# Patient Record
Sex: FEMALE | Race: BLACK OR AFRICAN AMERICAN | Employment: FULL TIME | URBAN - METROPOLITAN AREA
[De-identification: names, ages, dates, MRNs, and addresses within clinical notes are randomized per-mention and may not be internally consistent; named-entity substitution may affect disease eponyms.]

---

## 2017-09-14 ENCOUNTER — OFFICE VISIT (OUTPATIENT)
Dept: GASTROENTEROLOGY | Facility: CLINIC | Age: 37
End: 2017-09-14

## 2017-09-14 VITALS
SYSTOLIC BLOOD PRESSURE: 102 MMHG | DIASTOLIC BLOOD PRESSURE: 72 MMHG | HEIGHT: 64 IN | BODY MASS INDEX: 26.98 KG/M2 | WEIGHT: 158 LBS

## 2017-09-14 DIAGNOSIS — Z86.19 HISTORY OF HELICOBACTER PYLORI INFECTION: Primary | ICD-10-CM

## 2017-09-14 PROCEDURE — 99212 OFFICE O/P EST SF 10 MIN: CPT | Performed by: INTERNAL MEDICINE

## 2017-09-14 PROCEDURE — 99203 OFFICE O/P NEW LOW 30 MIN: CPT | Performed by: INTERNAL MEDICINE

## 2017-09-14 RX ORDER — PANTOPRAZOLE SODIUM 40 MG/1
TABLET, DELAYED RELEASE ORAL
COMMUNITY
Start: 2017-06-21 | End: 2017-11-14

## 2017-09-14 NOTE — PATIENT INSTRUCTIONS
1.  We will research the medications used to treat the H. pylori infection in the past.  2.  Please obtain an H. pylori breath test.  No antibiotics or acid lowering medications for a few weeks prior. 3.  Further advice pending the above results.

## 2017-09-14 NOTE — PROGRESS NOTES
HPI:    Patient ID: Jacques Robertson is a 40year old female. HPI  The patient is self-referred by a coworker who is a patient of mine.   About 2 years prior she noted upper abdominal/periumbilical burning discomfort especially during or shortly after a me Exam   Constitutional: She is oriented to person, place, and time. She appears well-developed and well-nourished. No distress. Healthy in appearance  Vague historian   HENT:   Head: Normocephalic and atraumatic. Mouth/Throat: No oropharyngeal exudate. If negative I would give strong consideration to an upper endoscopy with esophageal and gastric biopsies and possibly Bravo pH monitoring to determine whether the patient's symptoms are due to other structural lesions, nonulcer dyspepsia or gastroesophagea

## 2017-09-16 ENCOUNTER — TELEPHONE (OUTPATIENT)
Dept: GASTROENTEROLOGY | Facility: CLINIC | Age: 37
End: 2017-09-16

## 2017-09-16 ENCOUNTER — APPOINTMENT (OUTPATIENT)
Dept: LAB | Facility: HOSPITAL | Age: 37
End: 2017-09-16
Attending: INTERNAL MEDICINE
Payer: COMMERCIAL

## 2017-09-16 DIAGNOSIS — Z86.19 HISTORY OF HELICOBACTER PYLORI INFECTION: ICD-10-CM

## 2017-09-16 PROCEDURE — 83013 H PYLORI (C-13) BREATH: CPT

## 2017-09-16 NOTE — TELEPHONE ENCOUNTER
GI RN staff: Please contact the patient's pharmacy listed below and have them fax a list of all medications prescribed within the past 2-3 years (specifically antibiotics that were used to treat H. pylori).

## 2017-09-17 LAB — H. PYLORI BREATH TEST: NEGATIVE

## 2017-09-18 NOTE — TELEPHONE ENCOUNTER
Contacted [facility] pharmacy and spoke with Sangeetha Thompson. She states she can only go back 24 months in the system and will fax all she has available of the pt's medication hx.    -Awaiting fax now.

## 2017-09-18 NOTE — TELEPHONE ENCOUNTER
Pharmacy records reviewed.   Pertinent antibiotics/GI medications prescribed as follows:    6/22/17: Amitiza  2/15/17: Amoxicillin and clarithromycin  7/25/16: Dicyclomine  5/26/16: Bismatrol  1-5/2016: Linzess, 145 mcg  2-5/2017 and 5/2016: Metronidazole

## 2017-09-21 ENCOUNTER — TELEPHONE (OUTPATIENT)
Dept: GASTROENTEROLOGY | Facility: CLINIC | Age: 37
End: 2017-09-21

## 2017-09-21 DIAGNOSIS — K21.9 GASTROESOPHAGEAL REFLUX DISEASE, ESOPHAGITIS PRESENCE NOT SPECIFIED: Primary | ICD-10-CM

## 2017-09-21 DIAGNOSIS — R10.13 DYSPEPSIA: ICD-10-CM

## 2017-09-21 NOTE — TELEPHONE ENCOUNTER
Dr. Otoniel Horton,     The pt's h. Pylori breath test resulted as NEGATIVE.  Per OV note on 9/16/17, the plan is \"If negative I would give strong consideration to an upper endoscopy with esophageal and gastric biopsies and possibly Bravo pH monitoring to determine wh

## 2017-09-21 NOTE — TELEPHONE ENCOUNTER
I spoke to the patient. We discussed the negative breath test for H. pylori. Next step in evaluation would be repeat EGD with esophageal and gastric biopsies and potentially Bravo pH monitoring. Rationale and nature discussed.   The patient is agreeable

## 2017-09-27 NOTE — TELEPHONE ENCOUNTER
Scheduled for:  EGD w/Bravo - 45126  Provider Name:  Dr. Tobin De Jesus  Date:  11/14/17  Location:  [facility]  Sedation:  MAC  Time:  8:00 am (pt is aware to arrive at 7:00 am)  Prep:  NPO after midnight, Prep instructions were given to pt over the phone, pt verbalized und

## 2017-11-07 ENCOUNTER — TELEPHONE (OUTPATIENT)
Dept: GASTROENTEROLOGY | Facility: CLINIC | Age: 37
End: 2017-11-07

## 2017-11-07 NOTE — TELEPHONE ENCOUNTER
Pt transferred from [facility] in endoscopy--wants work excuse for her egd/bravo on 11/14 to be sent to my chart. This was done.

## 2017-11-14 ENCOUNTER — SURGERY (OUTPATIENT)
Age: 37
End: 2017-11-14

## 2017-11-14 ENCOUNTER — ANESTHESIA EVENT (OUTPATIENT)
Dept: ENDOSCOPY | Facility: HOSPITAL | Age: 37
End: 2017-11-14
Payer: COMMERCIAL

## 2017-11-14 ENCOUNTER — HOSPITAL ENCOUNTER (OUTPATIENT)
Facility: HOSPITAL | Age: 37
Setting detail: HOSPITAL OUTPATIENT SURGERY
Discharge: HOME | End: 2017-11-14
Attending: INTERNAL MEDICINE | Admitting: INTERNAL MEDICINE
Payer: COMMERCIAL

## 2017-11-14 ENCOUNTER — ANESTHESIA (OUTPATIENT)
Dept: ENDOSCOPY | Facility: HOSPITAL | Age: 37
End: 2017-11-14
Payer: COMMERCIAL

## 2017-11-14 DIAGNOSIS — R10.13 DYSPEPSIA: ICD-10-CM

## 2017-11-14 DIAGNOSIS — K21.9 GASTROESOPHAGEAL REFLUX DISEASE, ESOPHAGITIS PRESENCE NOT SPECIFIED: ICD-10-CM

## 2017-11-14 DIAGNOSIS — K21.9 ACID REFLUX: Primary | ICD-10-CM

## 2017-11-14 PROCEDURE — 0DB98ZX EXCISION OF DUODENUM, VIA NATURAL OR ARTIFICIAL OPENING ENDOSCOPIC, DIAGNOSTIC: ICD-10-PCS | Performed by: INTERNAL MEDICINE

## 2017-11-14 PROCEDURE — 0DH582Z INSERTION OF MONITORING DEVICE INTO ESOPHAGUS, VIA NATURAL OR ARTIFICIAL OPENING ENDOSCOPIC: ICD-10-PCS | Performed by: INTERNAL MEDICINE

## 2017-11-14 PROCEDURE — 0DB38ZX EXCISION OF LOWER ESOPHAGUS, VIA NATURAL OR ARTIFICIAL OPENING ENDOSCOPIC, DIAGNOSTIC: ICD-10-PCS | Performed by: INTERNAL MEDICINE

## 2017-11-14 PROCEDURE — 91035 G-ESOPH REFLX TST W/ELECTROD: CPT | Performed by: INTERNAL MEDICINE

## 2017-11-14 PROCEDURE — 43239 EGD BIOPSY SINGLE/MULTIPLE: CPT | Performed by: INTERNAL MEDICINE

## 2017-11-14 PROCEDURE — 0DB18ZX EXCISION OF UPPER ESOPHAGUS, VIA NATURAL OR ARTIFICIAL OPENING ENDOSCOPIC, DIAGNOSTIC: ICD-10-PCS | Performed by: INTERNAL MEDICINE

## 2017-11-14 PROCEDURE — 0DB68ZX EXCISION OF STOMACH, VIA NATURAL OR ARTIFICIAL OPENING ENDOSCOPIC, DIAGNOSTIC: ICD-10-PCS | Performed by: INTERNAL MEDICINE

## 2017-11-14 RX ORDER — LIDOCAINE HYDROCHLORIDE 10 MG/ML
INJECTION, SOLUTION EPIDURAL; INFILTRATION; INTRACAUDAL; PERINEURAL AS NEEDED
Status: DISCONTINUED | OUTPATIENT
Start: 2017-11-14 | End: 2017-11-14 | Stop reason: SURG

## 2017-11-14 RX ORDER — HYDROCODONE BITARTRATE AND ACETAMINOPHEN 5; 325 MG/1; MG/1
2 TABLET ORAL AS NEEDED
Status: DISCONTINUED | OUTPATIENT
Start: 2017-11-14 | End: 2017-11-14

## 2017-11-14 RX ORDER — HYDROCODONE BITARTRATE AND ACETAMINOPHEN 5; 325 MG/1; MG/1
1 TABLET ORAL AS NEEDED
Status: DISCONTINUED | OUTPATIENT
Start: 2017-11-14 | End: 2017-11-14

## 2017-11-14 RX ORDER — SODIUM CHLORIDE, SODIUM LACTATE, POTASSIUM CHLORIDE, CALCIUM CHLORIDE 600; 310; 30; 20 MG/100ML; MG/100ML; MG/100ML; MG/100ML
INJECTION, SOLUTION INTRAVENOUS CONTINUOUS
Status: DISCONTINUED | OUTPATIENT
Start: 2017-11-14 | End: 2017-11-14

## 2017-11-14 RX ORDER — HYDROMORPHONE HYDROCHLORIDE 1 MG/ML
0.6 INJECTION, SOLUTION INTRAMUSCULAR; INTRAVENOUS; SUBCUTANEOUS EVERY 5 MIN PRN
Status: DISCONTINUED | OUTPATIENT
Start: 2017-11-14 | End: 2017-11-14

## 2017-11-14 RX ORDER — MORPHINE SULFATE 10 MG/ML
6 INJECTION, SOLUTION INTRAMUSCULAR; INTRAVENOUS EVERY 10 MIN PRN
Status: DISCONTINUED | OUTPATIENT
Start: 2017-11-14 | End: 2017-11-14

## 2017-11-14 RX ORDER — HALOPERIDOL 5 MG/ML
0.25 INJECTION INTRAMUSCULAR ONCE AS NEEDED
Status: DISCONTINUED | OUTPATIENT
Start: 2017-11-14 | End: 2017-11-14

## 2017-11-14 RX ORDER — ONDANSETRON 2 MG/ML
4 INJECTION INTRAMUSCULAR; INTRAVENOUS ONCE AS NEEDED
Status: DISCONTINUED | OUTPATIENT
Start: 2017-11-14 | End: 2017-11-14

## 2017-11-14 RX ORDER — MORPHINE SULFATE 4 MG/ML
2 INJECTION, SOLUTION INTRAMUSCULAR; INTRAVENOUS EVERY 10 MIN PRN
Status: DISCONTINUED | OUTPATIENT
Start: 2017-11-14 | End: 2017-11-14

## 2017-11-14 RX ORDER — MORPHINE SULFATE 4 MG/ML
4 INJECTION, SOLUTION INTRAMUSCULAR; INTRAVENOUS EVERY 10 MIN PRN
Status: DISCONTINUED | OUTPATIENT
Start: 2017-11-14 | End: 2017-11-14

## 2017-11-14 RX ORDER — HYDROMORPHONE HYDROCHLORIDE 1 MG/ML
0.4 INJECTION, SOLUTION INTRAMUSCULAR; INTRAVENOUS; SUBCUTANEOUS EVERY 5 MIN PRN
Status: DISCONTINUED | OUTPATIENT
Start: 2017-11-14 | End: 2017-11-14

## 2017-11-14 RX ORDER — HYDROMORPHONE HYDROCHLORIDE 1 MG/ML
0.2 INJECTION, SOLUTION INTRAMUSCULAR; INTRAVENOUS; SUBCUTANEOUS EVERY 5 MIN PRN
Status: DISCONTINUED | OUTPATIENT
Start: 2017-11-14 | End: 2017-11-14

## 2017-11-14 RX ORDER — NALOXONE HYDROCHLORIDE 0.4 MG/ML
80 INJECTION, SOLUTION INTRAMUSCULAR; INTRAVENOUS; SUBCUTANEOUS AS NEEDED
Status: DISCONTINUED | OUTPATIENT
Start: 2017-11-14 | End: 2017-11-14

## 2017-11-14 RX ADMIN — SODIUM CHLORIDE, SODIUM LACTATE, POTASSIUM CHLORIDE, CALCIUM CHLORIDE: 600; 310; 30; 20 INJECTION, SOLUTION INTRAVENOUS at 08:09:00

## 2017-11-14 RX ADMIN — SODIUM CHLORIDE, SODIUM LACTATE, POTASSIUM CHLORIDE, CALCIUM CHLORIDE: 600; 310; 30; 20 INJECTION, SOLUTION INTRAVENOUS at 08:25:00

## 2017-11-14 RX ADMIN — LIDOCAINE HYDROCHLORIDE 25 MG: 10 INJECTION, SOLUTION EPIDURAL; INFILTRATION; INTRACAUDAL; PERINEURAL at 08:12:00

## 2017-11-14 NOTE — OPERATIVE REPORT
[facility] Endoscopy Report      Date of Procedure:  11/14/17        Preoperative Diagnosis:  Dyspepsia and possible gastroesophageal reflux      Postoperative Diagnosis:  2 cm hiatal hernia      Procedure:    Esophagogastroduodenoscopy wit duodenal bulb and post bulbar regions were normal.  Biopsies of the second portion the duodenum were obtained. Following diagnostic endoscopy the Bravo delivery catheter was placed into the esophagus. Endoscopy confirmed placement within the esophagus.

## 2017-11-14 NOTE — ANESTHESIA PREPROCEDURE EVALUATION
Anesthesia PreOp Note    HPI:     Danial Velazco is a 40year old female who presents for preoperative consultation requested by: Mei Conde MD    Date of Surgery: 11/14/2017    Procedure(s):  BRAVO ESOPHAGOGASTRODUODENOSCOPY  ESOPHAGOGASTRODUOD reviewed and Nursing notes reviewed    Airway   Mallampati: II  Dental - normal exam     Pulmonary - negative ROS and normal exam   Cardiovascular - negative ROS and normal exam  Exercise tolerance: good    Neuro/Psych - negative ROS     GI/Hepatic/Renal -

## 2017-11-14 NOTE — ANESTHESIA POSTPROCEDURE EVALUATION
Patient: Nohemi Nichols    Procedure Summary     Date:  11/14/17 Room / Location:  [address] [place] [address] ENDOSCOPY 05 / [address] [place] [address] ENDOSCOPY    Anesthesia Start:  0809 Anesthesia Stop:  6196    Procedures:       BRAVO ESOPHAGOGASTRODUODENOSCOPY (N/A )      Nish Bass

## 2017-11-14 NOTE — H&P
History & Physical Examination    Patient Name: Maryan Mora  MRN: [id number]  CSN: [id number]  YOB: 1980    Diagnosis: Dyspepsia possible GERD        Prescriptions Prior to Admission:  Pantoprazole Sodium 40 MG Oral Tab EC  Disp:  Rfl:  [facility]

## 2017-11-15 VITALS
BODY MASS INDEX: 26.12 KG/M2 | WEIGHT: 153 LBS | RESPIRATION RATE: 16 BRPM | TEMPERATURE: 98 F | HEART RATE: 83 BPM | HEIGHT: 64 IN | SYSTOLIC BLOOD PRESSURE: 118 MMHG | OXYGEN SATURATION: 100 % | DIASTOLIC BLOOD PRESSURE: 73 MMHG

## 2017-11-16 ENCOUNTER — OFFICE VISIT (OUTPATIENT)
Dept: OBGYN CLINIC | Facility: CLINIC | Age: 37
End: 2017-11-16

## 2017-11-16 VITALS
BODY MASS INDEX: 26.29 KG/M2 | WEIGHT: 154 LBS | HEIGHT: 64 IN | DIASTOLIC BLOOD PRESSURE: 68 MMHG | SYSTOLIC BLOOD PRESSURE: 112 MMHG

## 2017-11-16 DIAGNOSIS — N89.8 VAGINAL DISCHARGE: Primary | ICD-10-CM

## 2017-11-16 PROCEDURE — 99204 OFFICE O/P NEW MOD 45 MIN: CPT | Performed by: OBSTETRICS & GYNECOLOGY

## 2017-11-16 PROCEDURE — 87106 FUNGI IDENTIFICATION YEAST: CPT | Performed by: OBSTETRICS & GYNECOLOGY

## 2017-11-16 PROCEDURE — 87205 SMEAR GRAM STAIN: CPT | Performed by: OBSTETRICS & GYNECOLOGY

## 2017-11-16 PROCEDURE — 87808 TRICHOMONAS ASSAY W/OPTIC: CPT | Performed by: OBSTETRICS & GYNECOLOGY

## 2017-11-16 RX ORDER — VALACYCLOVIR HYDROCHLORIDE 1 G/1
1000 TABLET, FILM COATED ORAL
COMMUNITY
Start: 2017-09-21 | End: 2018-09-11

## 2017-11-16 RX ORDER — ALBUTEROL SULFATE 90 UG/1
2 AEROSOL, METERED RESPIRATORY (INHALATION)
COMMUNITY
Start: 2017-09-21 | End: 2018-09-11

## 2017-11-16 RX ORDER — PANTOPRAZOLE SODIUM 40 MG/1
40 TABLET, DELAYED RELEASE ORAL
COMMUNITY
Start: 2017-06-21 | End: 2018-03-07 | Stop reason: CLARIF

## 2017-11-16 RX ORDER — CLINDAMYCIN HYDROCHLORIDE 300 MG/1
300 CAPSULE ORAL 2 TIMES DAILY
Qty: 14 CAPSULE | Refills: 0 | Status: SHIPPED | OUTPATIENT
Start: 2017-11-16 | End: 2018-01-03

## 2017-11-16 RX ORDER — LUBIPROSTONE 8 UG/1
16 CAPSULE, GELATIN COATED ORAL
COMMUNITY
Start: 2017-06-21 | End: 2018-01-09 | Stop reason: ALTCHOICE

## 2017-11-16 NOTE — PROGRESS NOTES
GYN H&P     2017  12:27 PM    CC: Patient is here to establish care    HPI: Patient is a 40year old   here with concerns about recurrent BV 8x/year, currently with symptoms of vaginal discharge and odor. Cycles regular. No pelvic pain.  No abn Social History Main Topics   Smoking status: Never Smoker    Smokeless tobacco: Never Used    Alcohol use Yes    Comment: socially    Drug use: No    Sexual activity: Not on file     Other Topics Concern   None on file     Social History Narrative   No

## 2017-11-20 ENCOUNTER — TELEPHONE (OUTPATIENT)
Dept: OBGYN CLINIC | Facility: CLINIC | Age: 37
End: 2017-11-20

## 2017-11-20 RX ORDER — FLUCONAZOLE 150 MG/1
150 TABLET ORAL ONCE
Qty: 2 TABLET | Refills: 0 | Status: SHIPPED | OUTPATIENT
Start: 2017-11-20 | End: 2017-11-20

## 2017-11-28 PROBLEM — J45.20 ASTHMA, MILD INTERMITTENT (HCC): Status: ACTIVE | Noted: 2017-11-28

## 2017-11-28 PROBLEM — J45.20 ASTHMA, MILD INTERMITTENT: Status: ACTIVE | Noted: 2017-11-28

## 2017-11-29 ENCOUNTER — OFFICE VISIT (OUTPATIENT)
Dept: FAMILY MEDICINE CLINIC | Facility: CLINIC | Age: 37
End: 2017-11-29

## 2017-11-29 VITALS
HEIGHT: 64 IN | OXYGEN SATURATION: 98 % | DIASTOLIC BLOOD PRESSURE: 64 MMHG | BODY MASS INDEX: 26.29 KG/M2 | HEART RATE: 72 BPM | WEIGHT: 154 LBS | SYSTOLIC BLOOD PRESSURE: 112 MMHG | TEMPERATURE: 99 F

## 2017-11-29 DIAGNOSIS — J30.2 CHRONIC SEASONAL ALLERGIC RHINITIS, UNSPECIFIED TRIGGER: Primary | ICD-10-CM

## 2017-11-29 DIAGNOSIS — J11.1 INFLUENZA-LIKE ILLNESS: ICD-10-CM

## 2017-11-29 PROCEDURE — 99202 OFFICE O/P NEW SF 15 MIN: CPT | Performed by: FAMILY MEDICINE

## 2017-11-29 PROCEDURE — 87631 RESP VIRUS 3-5 TARGETS: CPT | Performed by: FAMILY MEDICINE

## 2017-11-29 RX ORDER — AZELASTINE HCL 205.5 UG/1
2 SPRAY NASAL DAILY
Qty: 1 EACH | Refills: 3 | Status: SHIPPED | OUTPATIENT
Start: 2017-11-29 | End: 2018-02-27

## 2017-11-29 RX ORDER — MONTELUKAST SODIUM 10 MG/1
10 TABLET ORAL NIGHTLY
Qty: 30 TABLET | Refills: 2 | Status: SHIPPED | OUTPATIENT
Start: 2017-11-29 | End: 2018-02-27

## 2017-11-29 NOTE — PROGRESS NOTES
CC:  Patient presents with:  New Patient  Other: was having diarrhea, vomiting, chills-now feeling better  Chest Congestion: x 4 months      HPI: 40year old female with hx of intermittent asthma here for congestion x 4 months and resolved diarrhea/vomitin None on file     Social History Narrative   None on file         Current Outpatient Prescriptions:  Azelastine HCl 0.15 % Nasal Solution 2 sprays by Nasal route daily.  Disp: 1 each Rfl: 3   Montelukast Sodium 10 MG Oral Tab Take 1 tablet (10 mg total) by Lalo Templeton DO  11/29/17  11:22 AM

## 2017-11-29 NOTE — PATIENT INSTRUCTIONS
Allergic Rhinitis  Allergic rhinitis is an allergic reaction that affects the nose, and often the eyes. It’s often known as nasal allergies. Nasal allergies are often due to things in the environment that are breathed in.  Depending what you are sensitive · Keep humidity low by using a dehumidifier or air conditioner. Keep the dehumidifier and air conditioner clean and free of mold. · Clean moldy areas with bleach and water. In general:  · Vacuum once or twice a week.  If possible, use a vacuum with a high

## 2017-12-13 ENCOUNTER — TELEPHONE (OUTPATIENT)
Dept: GASTROENTEROLOGY | Facility: CLINIC | Age: 37
End: 2017-12-13

## 2017-12-13 NOTE — TELEPHONE ENCOUNTER
Pt. States that she was not able to get her medication as Prior Naoma Dave is needed for the Rifaximin med. Please call [facility] BS for prior Auth.

## 2017-12-13 NOTE — TELEPHONE ENCOUNTER
Received fax and sent back    Does not have have hepatic encephalopathy or IBS-D    History of SIBO, history of h pylori, dyspepsia    Tried and failed many other drugs. See previous encounter.     Form faxed back today

## 2017-12-15 NOTE — TELEPHONE ENCOUNTER
Received denial fax from [facility]    Will only approve for IBS-D or hepatic encephalopathy.      Please advise

## 2017-12-18 NOTE — TELEPHONE ENCOUNTER
Received fax back from [facility] that already denied. We are aware it was denied, which is why we sent in APPEAL paperwork to fax # specifically noted on denial paperwork.     I called [facility] today and spoke with Edel Means who asked his senior supervi

## 2017-12-20 NOTE — TELEPHONE ENCOUNTER
First level appeal was denied.  Now stating we can also do a 2nd level appeal. This was fax in and await determination

## 2017-12-29 RX ORDER — AMOXICILLIN AND CLAVULANATE POTASSIUM 875; 125 MG/1; MG/1
1 TABLET, FILM COATED ORAL 2 TIMES DAILY
Qty: 28 TABLET | Refills: 0 | Status: SHIPPED | OUTPATIENT
Start: 2017-12-29 | End: 2018-03-07 | Stop reason: ALTCHOICE

## 2017-12-29 NOTE — TELEPHONE ENCOUNTER
Spoke with pt. She does not want to take metronidazole. Wants to try Augmentin. Reviewed BID for 14 days. Confirmed pharmacy and advised she call us back if she develops any severe side effects or reactions to this medication.     Can call in 2 week

## 2017-12-29 NOTE — TELEPHONE ENCOUNTER
We could use either Augmentin 875 mg twice daily for 14 days or a combination of cephalexin and metronidazole, each taken together at 500 mg 3 times daily for 14 days. Patient preference.

## 2018-01-02 ENCOUNTER — OFFICE VISIT (OUTPATIENT)
Dept: OBGYN CLINIC | Facility: CLINIC | Age: 38
End: 2018-01-02

## 2018-01-02 VITALS
SYSTOLIC BLOOD PRESSURE: 106 MMHG | BODY MASS INDEX: 27.31 KG/M2 | DIASTOLIC BLOOD PRESSURE: 70 MMHG | HEIGHT: 64 IN | WEIGHT: 160 LBS

## 2018-01-02 DIAGNOSIS — N76.0 VAGINITIS AND VULVOVAGINITIS: Primary | ICD-10-CM

## 2018-01-03 ENCOUNTER — TELEPHONE (OUTPATIENT)
Dept: OBGYN CLINIC | Facility: CLINIC | Age: 38
End: 2018-01-03

## 2018-01-03 RX ORDER — CLINDAMYCIN HYDROCHLORIDE 300 MG/1
300 CAPSULE ORAL 2 TIMES DAILY
Qty: 14 CAPSULE | Refills: 0 | Status: SHIPPED | OUTPATIENT
Start: 2018-01-03 | End: 2018-01-10

## 2018-01-03 NOTE — TELEPHONE ENCOUNTER
Pt calling with c/o vaginal odor with yellowish discharge. She stated that she had the same symptoms in November for which she was treated for BV. Rx for Clindamycin sent to [facility]. Schedule appointment if no improvement.

## 2018-01-09 ENCOUNTER — OFFICE VISIT (OUTPATIENT)
Dept: FAMILY MEDICINE CLINIC | Facility: CLINIC | Age: 38
End: 2018-01-09

## 2018-01-09 ENCOUNTER — APPOINTMENT (OUTPATIENT)
Dept: LAB | Facility: REFERENCE LAB | Age: 38
End: 2018-01-09
Attending: FAMILY MEDICINE
Payer: COMMERCIAL

## 2018-01-09 VITALS
HEIGHT: 64 IN | BODY MASS INDEX: 27.31 KG/M2 | DIASTOLIC BLOOD PRESSURE: 80 MMHG | RESPIRATION RATE: 17 BRPM | OXYGEN SATURATION: 96 % | HEART RATE: 97 BPM | SYSTOLIC BLOOD PRESSURE: 100 MMHG | WEIGHT: 160 LBS | TEMPERATURE: 98 F

## 2018-01-09 DIAGNOSIS — R10.84 GENERALIZED ABDOMINAL PAIN: ICD-10-CM

## 2018-01-09 DIAGNOSIS — R14.0 BLOATING: ICD-10-CM

## 2018-01-09 DIAGNOSIS — K59.04 CHRONIC IDIOPATHIC CONSTIPATION: ICD-10-CM

## 2018-01-09 DIAGNOSIS — R14.0 BLOATING: Primary | ICD-10-CM

## 2018-01-09 PROCEDURE — 86628 CANDIDA ANTIBODY: CPT | Performed by: FAMILY MEDICINE

## 2018-01-09 PROCEDURE — 86001 ALLERGEN SPECIFIC IGG: CPT | Performed by: FAMILY MEDICINE

## 2018-01-09 PROCEDURE — 36415 COLL VENOUS BLD VENIPUNCTURE: CPT | Performed by: FAMILY MEDICINE

## 2018-01-09 PROCEDURE — 99214 OFFICE O/P EST MOD 30 MIN: CPT | Performed by: FAMILY MEDICINE

## 2018-01-09 PROCEDURE — 82306 VITAMIN D 25 HYDROXY: CPT | Performed by: FAMILY MEDICINE

## 2018-01-09 NOTE — PROGRESS NOTES
Alejo Villarreal is a 40year old female. Patient presents with:  Stomach Pain: patient comes into office c/o constant stomach pain which has been going on for 3 years.  Patient states she has trouble using bathroon, unable to finish meal, unable to drinkl Montelukast Sodium 10 MG Oral Tab Take 1 tablet (10 mg total) by mouth nightly. Disp: 30 tablet Rfl: 2   Albuterol Sulfate  (90 Base) MCG/ACT Inhalation Aero Soln Inhale 2 puffs into the lungs.  Disp:  Rfl:    Pantoprazole Sodium 40 MG Oral Tab EC Ta Neurological: She is alert and oriented to person, place, and time. No cranial nerve deficit. Gait normal.   Skin: Skin is warm and dry. Psychiatric: Affect normal.       ASSESSMENT AND PLAN:   1. Bloating  - CANDIDA IGG/A/M AB PANEL;  Future  - ALLERGEN, The products and items listed below (the “Products”)  and their claims have not been evaluated by the Food and Drug Administration. Dietary products are not intended to treat, prevent, mitigate or cure disease.  Ultimately, you must draw your own conclusion Return in about 1 week (around 1/16/2018) for Integrative Medicine - Established (30 min). Patient affirmed understanding of plan and all questions were answered.      Jacques Urban, DO

## 2018-01-09 NOTE — PATIENT INSTRUCTIONS
KNOW BEFORE YOU GO! Coverage for testing may vary based on your insurance plan. In order to estimate your out-of-pocket cost for any labs, tests or imaging ordered, please check with your insurance company or use the link below:    [employer] or Start taking 1/8 tsp daily and increase dose every 5-7 days until taking 1 tsp 3 times daily. Reduce to lowest dose tolerated if any reactions occur.  Buy it online at  http://NewsWhip/ or at the [employer]! in [address]  In

## 2018-01-10 LAB — 25(OH)D3 SERPL-MCNC: 9.9 NG/ML

## 2018-01-11 LAB
CANDIDA ANTIBODY IGA: 1.27 EV
CANDIDA ANTIBODY IGG: 1.23 EV
CANDIDA ANTIBODY IGM: 0.48 EV

## 2018-01-12 LAB
ALLERGEN ALMOND IGG: <2 MCG/ML
ALLERGEN BAKERS YEAST IGG: 2.31 MCG/ML
ALLERGEN BANANA IGG: <2 MCG/ML
ALLERGEN CASEIN (COW'S MILK) IGG: 4.75 MCG/ML
ALLERGEN CHICKEN IGG: <2 MCG/ML
ALLERGEN CORN IGG: <2 MCG/ML
ALLERGEN EGG WHITE IGG: <2 MCG/ML
ALLERGEN GARLIC IGG: 2.12 MCG/ML
ALLERGEN GLUTEN IGG: 2.23 MCG/ML
ALLERGEN OAT IGG: 2.61 MCG/ML
ALLERGEN ORANGE IGG: <2 MCG/ML
ALLERGEN PEANUT IGG: <2 MCG/ML
ALLERGEN RICE IGG: <2 MCG/ML
ALLERGEN SOYBEAN IGG: <2 MCG/ML
ALLERGEN TOMATO IGG: <2 MCG/ML
ALLERGEN WHEAT IGG: 3.58 MCG/ML
ALLERGEN WHEY IGG: 10.4 MCG/ML
ALLERGEN WHITE POTATO, IGG: <2 MCG/ML

## 2018-02-12 RX ORDER — ERGOCALCIFEROL 1.25 MG/1
50000 CAPSULE ORAL WEEKLY
Qty: 12 CAPSULE | Refills: 1 | Status: SHIPPED | OUTPATIENT
Start: 2018-02-12 | End: 2018-09-05

## 2018-02-27 ENCOUNTER — OFFICE VISIT (OUTPATIENT)
Dept: OBGYN CLINIC | Facility: CLINIC | Age: 38
End: 2018-02-27

## 2018-02-27 VITALS
HEIGHT: 64 IN | BODY MASS INDEX: 26.14 KG/M2 | WEIGHT: 153.13 LBS | DIASTOLIC BLOOD PRESSURE: 64 MMHG | SYSTOLIC BLOOD PRESSURE: 108 MMHG

## 2018-02-27 DIAGNOSIS — N89.8 VAGINAL DISCHARGE: Primary | ICD-10-CM

## 2018-02-27 PROCEDURE — 87205 SMEAR GRAM STAIN: CPT | Performed by: OBSTETRICS & GYNECOLOGY

## 2018-02-27 PROCEDURE — 99212 OFFICE O/P EST SF 10 MIN: CPT | Performed by: OBSTETRICS & GYNECOLOGY

## 2018-02-27 PROCEDURE — 87106 FUNGI IDENTIFICATION YEAST: CPT | Performed by: OBSTETRICS & GYNECOLOGY

## 2018-02-27 PROCEDURE — 87808 TRICHOMONAS ASSAY W/OPTIC: CPT | Performed by: OBSTETRICS & GYNECOLOGY

## 2018-02-27 NOTE — PROGRESS NOTES
Chandra Severance is a 40year old female. HPI:   Patient presents with:  Vaginal Problem: Vaginal discharge       BV+/yeast- 11/2017 Tx'd with oral Clindamycin then re-treated 1/2018 for similar symptoms now with perisitent vaginal discharge.  No STD scree

## 2018-02-28 ENCOUNTER — TELEPHONE (OUTPATIENT)
Dept: OBGYN CLINIC | Facility: CLINIC | Age: 38
End: 2018-02-28

## 2018-02-28 RX ORDER — CLINDAMYCIN HYDROCHLORIDE 300 MG/1
300 CAPSULE ORAL 2 TIMES DAILY
Qty: 14 CAPSULE | Refills: 0 | Status: SHIPPED | OUTPATIENT
Start: 2018-02-28 | End: 2018-03-07

## 2018-02-28 NOTE — TELEPHONE ENCOUNTER
----- Message from Stacy Gould MD sent at 2/28/2018 10:59 AM CST -----  Your lab results are positive for BV with yeast still pending    An eRx will be done for oral Clindamycin 300 mg twice daily for 7d  We will notify you when yeast results return.

## 2018-02-28 NOTE — TELEPHONE ENCOUNTER
Spoke with pt given results of Negative Trich and + Bv with yeast still pending. Rx sent to the pharmacy.

## 2018-03-01 LAB
GENITAL VAGINOSIS SCREEN: POSITIVE
TRICHOMONAS SCREEN: NEGATIVE

## 2018-03-06 ENCOUNTER — OFFICE VISIT (OUTPATIENT)
Dept: FAMILY MEDICINE CLINIC | Facility: CLINIC | Age: 38
End: 2018-03-06

## 2018-03-06 VITALS
DIASTOLIC BLOOD PRESSURE: 72 MMHG | BODY MASS INDEX: 26.46 KG/M2 | SYSTOLIC BLOOD PRESSURE: 100 MMHG | HEART RATE: 70 BPM | RESPIRATION RATE: 17 BRPM | HEIGHT: 64 IN | OXYGEN SATURATION: 99 % | WEIGHT: 155 LBS

## 2018-03-06 DIAGNOSIS — B37.9 CANDIDIASIS: ICD-10-CM

## 2018-03-06 DIAGNOSIS — J45.20 MILD INTERMITTENT ASTHMA WITHOUT COMPLICATION: ICD-10-CM

## 2018-03-06 DIAGNOSIS — J30.2 CHRONIC SEASONAL ALLERGIC RHINITIS: Primary | ICD-10-CM

## 2018-03-06 DIAGNOSIS — E66.3 OVERWEIGHT (BMI 25.0-29.9): ICD-10-CM

## 2018-03-06 DIAGNOSIS — T78.1XXA FOOD SENSITIVITY WITH GASTROINTESTINAL SYMPTOMS: ICD-10-CM

## 2018-03-06 PROCEDURE — 99214 OFFICE O/P EST MOD 30 MIN: CPT | Performed by: FAMILY MEDICINE

## 2018-03-06 NOTE — PROGRESS NOTES
Chandra Severance is a 40year old female. Patient presents with: Follow - Up: diet and lab results      HPI:   Improvements: a lot less pain in her stomach, more energy. Generally feeling good nw. Feels so much better.    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 - How many visits are allowed for the year? - Do you have an annual deductible and has it been met?  - Do you have a co-payment or co-insurance?  - Do you have in or out-of-network coverage?     On · 1-2 oz crushed ice  · 1-2 scoops protein powder  · 5 medium frozen blackberries  · 5 medium frozen blueberries  · 7 pecans  Combine in  and mix until smooth.   Cherry Vanilla Shake:    · 2 scoops vanilla protein powder  · 8 frozen cherries  · 1 cup DRINKS  Decaf coffee  Green tea  Vegetable juice  Vegetables HIGH SUGAR FRUITS  Bananas  Dates  Fruit juices  Grapes  Uzair  Raisins  GLUTINOUS GRAINS  Barley  [place]  Spelt  Wheat  TOXIC MEATS & FISH  Pork  Processed meats  Shellfish  Swordfish  Tuna  SOME D Fruits should generally be avoided or minimized until later in the diet, but there are some exceptions. Penny and limes are really nutritious low sugar fruits that you can use to flavor your food and salad dressing.  Avocados and olives are also fruits, al Probiotic yogurt and kefir tend to be low in lactose and casein, the natural sugar and protein that are responsible for many dairy intolerances. Nuts And Seeds  Nuts like almond and hazelnut are healthy and have a low mold content.  Any residual mold can b Making your own fermented foods at home will guarantee quality and allow you to reduce the sugar content even lower than the store-bought varieties.   Drinks  There are lots of herbal teas that have antifungal properties, and in fact you can drink any herba You should aim to eliminate caffeine completely, but there are other options if that’s proving impossible for you. Decaf coffee still contains small amounts of caffeine, but look for decaf that is made by the Swiss water process.  Green tea is also lower in Many people don’t realize that the grain varieties we eat today are very different from those that our ancestors enjoyed. Today’s varieties have increased yields, but they also have much more gluten than was present before.  Our digestive systems are simply Whey isolate is another dairy food that should be avoided. The processing used to produce whey isolates produces damaging D optical isomers, which are not designed to be in your body.  When these D proteins end up in your bone, brain and muscle, they have b Refined and processed vegetable oils are generally bad news for your health, and you should avoid them while on this anti-Candida diet. There are plenty of excellent oils to choose from, without resorting to these over-processed vegetable derivatives.  Also I already mentioned fruit juices in the fruits section – these should be avoided because of the way that they affect your blood sugar. Also steer yourself away from sugary soft drinks, and diet drinks that contain aspartame or sucralose.  Energy drinks tend ? ½ of a medium banana, peeled  ? ½” fresh robert – peeled and cut from stalk (about ½ tsp)  ? ¼ tsp ground turmeric  ? 3 mint leaves - rough chopped  ? 1 scoop protein powder  ? 1 Tbsp farrah seeds  ?  4-5 ice cubes (or more or less to personal desired consi 4 cups chopped yellow onions (3 large onions)   4 cups chopped leeks, white part only (2 leeks)   1 tablespoon minced garlic (3 cloves)   1/4 cup good olive oil, plus additional for drizzling on top   1 tablespoon kosher salt   1-½ teaspoons freshly ground 3. In a small bowl, whisk together the red wine vinegar, Loy and oregano. 4. Add honey and olive oil, to taste, and blend until emulsified. Season with salt and pepper, to taste. 5. Add the dressing to the eggplant mixture and toss. 4. Roast until the chicken is just cooked through, about 1 hour. If your chicken browns too quickly, cover it with foil for the remaining cooking time. 5. Transfer the chicken to a serving platter.     HERBED SALMON  Ingredients  1/2 seedless cucumber 2 teaspoons toasted sesame oil  1 teaspoon adalberto  2 eggs, beaten  1 tablespoon Organic Shelled Hemp Seed    Directions  1. In a large wok or nonstick skillet heat the coconut oil.  Sauté the carrot, onion, and white scallion over high heat until soft and 1 cup + 2 Tbsp almond butter (or any nut butter)  2 Tbsp coconut oil  4 1/2 oz dark chocolate? Directions  1. In a large bowl, combine the almond flour, coconut, and sweetener. 2. Over medium-low heat melt 1 cup of almond butter and coconut oil.

## 2018-03-06 NOTE — PATIENT INSTRUCTIONS
In order to determine if Medical Nutrition Therapy and/or Acupuncture are covered benefits, I encourage you call your insurance company and ask:  - If (acupuncture/medical nutrition) is a covered benefit?  - Does your coverage have any exclusions or limita Try green tea instead of coffee for a little caffeine    For breakfast: non-dairy protein shake with almond milk, or non-dairy smoothie    Protein Shake Recipes    Berry Shake:    · 10 oz cold filtered water  · 1-2 oz crushed ice  · 1-2 scoops protein powd Pears  Strawberries  Watermelon  GRAINS & PSEUDO-GRAINS  Amaranth  Black rice  Brown rice  Wild rice  RED MEATS  Beef  Lamb  Venison  NUTS  Nut milks  Nut butters  Walnuts  Pecans  FERMENTED FOODS  Kombucha  Kvass  DRINKS  Decaf coffee  Green tea  Vegetabl Minimize starchy vegetables such as sweet potatoes, potatoes, yams, corn, winter squash, beets, peas, parsnips, and beans, especially in the early part of your treatment.  When you switch to a low sugar diet, there is often a temptation to eat lots of starc The best dairy products to eat are the fermented ones like yogurt and kefir. Live probiotic cultures help your gut to repopulate with good bacteria.  The live bacteria in the yogurt will crowd out the Candida yeast and restore balance to your digestive syst Stevia, erythritol, and xylitol can be used in place of sugar, but they have a much smaller effect on your blood sugar levels. You generally need only a little of these sweeteners, as they are much sweeter tasting than sugar.  These sweeteners are particula Nut milks and butters tend to be more problematic than the nuts themselves. Making your own is the best way, but if you can’t do that then make sure that you buy a reputable organic brand.   Fermented Foods  Foods like kefir and sauerkraut tend to be most b Gluten is a very common trigger for food sensitivities, and often results in symptoms like bloating, indigestion, cramping, brain fog, and fatigue. If you already have a Candida problem, eating gluten is likely to exacerbate your symptoms.  It also likely t Most dairy should be avoided except ghee, butter, kefir, and probiotic yogurt.  Dairy foods tend to contain lots of natural sugars (e.g. lactose), and they can also be difficult to digest. Many people have latent sensitivities to dairy products (especially Many condiments and salad dressings tend to contain large amounts of hidden sugars, which can exacerbate your Candida overgrowth. Products like salad dressings might be marketed as a healthy choice, when in reality they are the exact opposite.  For an alter There are healthier sweetener choices that won’t raise your blood sugar or cause long term health problems. Try sweeteners like stevia, erythritol, or xylitol instead.   Non-Alcoholic Drinks  Caffeine can cause your blood sugar to rise, but the main problem ? Whole food or raw food protein bars ([employer] Revolution and [address])  ? Roasted Seaweed  ? Roasted Kale  ? Fruit- in moderation  ? Raw cut up veggies (with hummus)        RECIPE IDEAS  Breakfast   ENERGIZING PINEAPPLE SMOOTHIE  Ingredients  ?  1 cup brewed an 2.  Stir in blueberries, cinnamon and walnuts until all are evenly warmed. Remove from heat and stir in raw honey. Top with farrah seeds. 3.  Serve in bowls and top with raw cacao nibs for an added pop of antioxidants.                  https://www.funmilayo.org/ 1 tablespoon coarsely chopped oregano leaves   Honey   Olive oil  Salt   Freshly ground black pepper   1 lemon, zested     Directions  1. Brush the eggplant and red onions with canola oil and arrange on the grill.  Cook the eggplant until soft and grill the 2. Combine the vinaigrette and chicken pieces in a large, resealable plastic bag; seal the bag and toss to coat. Refrigerate, turning the chicken pieces occasionally, for at least 2 hours and up to one day. 3. Preheat the oven to 400 degrees F.  Remov 2 tablespoons Organic Extra Virgin Coconut Oil  2 medium carrots, diced  1 small yellow onion, diced  1 bunch scallions, white and green parts , thinly sliced  1 cup thinly sliced snap peas  2 garlic cloves, minced  1 tablespoon minced fresh ginge 3.  Fill greased or paper-lined muffin cups 2/3 full. 4. Add banana slice and walnuts and bake for 15-18 minutes.    NO BAKE ALMOND BARS  Ingredients  3/4 cup almond flour  3/4 cup unsweetened finely shredded coconut  3/4 cup equivalent powdered sweetener

## 2018-03-07 ENCOUNTER — APPOINTMENT (OUTPATIENT)
Dept: LAB | Facility: REFERENCE LAB | Age: 38
End: 2018-03-07
Attending: FAMILY MEDICINE
Payer: COMMERCIAL

## 2018-03-07 ENCOUNTER — OFFICE VISIT (OUTPATIENT)
Dept: FAMILY MEDICINE CLINIC | Facility: CLINIC | Age: 38
End: 2018-03-07

## 2018-03-07 VITALS
WEIGHT: 151 LBS | DIASTOLIC BLOOD PRESSURE: 80 MMHG | BODY MASS INDEX: 25.46 KG/M2 | HEART RATE: 87 BPM | HEIGHT: 64.5 IN | SYSTOLIC BLOOD PRESSURE: 100 MMHG | RESPIRATION RATE: 16 BRPM | OXYGEN SATURATION: 98 %

## 2018-03-07 DIAGNOSIS — Z23 NEED FOR VACCINATION: ICD-10-CM

## 2018-03-07 DIAGNOSIS — Z00.00 ENCOUNTER FOR ROUTINE ADULT HEALTH EXAMINATION WITHOUT ABNORMAL FINDINGS: ICD-10-CM

## 2018-03-07 DIAGNOSIS — Z00.00 ENCOUNTER FOR ROUTINE ADULT HEALTH EXAMINATION WITHOUT ABNORMAL FINDINGS: Primary | ICD-10-CM

## 2018-03-07 LAB
CHOLEST SERPL-MCNC: 194 MG/DL (ref 110–200)
GLUCOSE SERPL-MCNC: 101 MG/DL (ref 70–99)
HDLC SERPL-MCNC: 55 MG/DL
LDLC SERPL CALC-MCNC: 130 MG/DL (ref 0–99)
NONHDLC SERPL-MCNC: 139 MG/DL
TRIGL SERPL-MCNC: 43 MG/DL (ref 1–149)

## 2018-03-07 PROCEDURE — 90715 TDAP VACCINE 7 YRS/> IM: CPT | Performed by: FAMILY MEDICINE

## 2018-03-07 PROCEDURE — 36415 COLL VENOUS BLD VENIPUNCTURE: CPT | Performed by: FAMILY MEDICINE

## 2018-03-07 PROCEDURE — 99395 PREV VISIT EST AGE 18-39: CPT | Performed by: FAMILY MEDICINE

## 2018-03-07 PROCEDURE — 82947 ASSAY GLUCOSE BLOOD QUANT: CPT | Performed by: FAMILY MEDICINE

## 2018-03-07 PROCEDURE — 80061 LIPID PANEL: CPT | Performed by: FAMILY MEDICINE

## 2018-03-07 PROCEDURE — 90471 IMMUNIZATION ADMIN: CPT | Performed by: FAMILY MEDICINE

## 2018-03-07 NOTE — PROGRESS NOTES
HPI:   Danial Velazco is a 40year old female who presents for a complete physical exam.     Last pap: 7/2015 and negative, had a LEEP June 2013 and normal pap smears since then    Menses: Regular, monthly periods  Contraception: Partner with vasectomy Hypertension Father    • Hypertension Mother       Social History:   Smoking status: Never Smoker                                                              Smokeless tobacco: Never Used                      Alcohol use:  No               Comment: sociall clinical breast exams  -Cervical cancer screening/pap smears  -Adequate calcium and Vitamin D intake to prevent osteoporosis  -Healthy diet including adequate intake of vegetables and fruits, appropriate portion sizes, minimizing highly concentrated carboh

## 2018-03-13 ENCOUNTER — MED REC SCAN ONLY (OUTPATIENT)
Dept: FAMILY MEDICINE CLINIC | Facility: CLINIC | Age: 38
End: 2018-03-13

## 2018-04-10 ENCOUNTER — OFFICE VISIT (OUTPATIENT)
Dept: OBGYN CLINIC | Facility: CLINIC | Age: 38
End: 2018-04-10

## 2018-04-10 VITALS — SYSTOLIC BLOOD PRESSURE: 116 MMHG | DIASTOLIC BLOOD PRESSURE: 74 MMHG

## 2018-04-10 DIAGNOSIS — N76.0 VAGINITIS AND VULVOVAGINITIS: Primary | ICD-10-CM

## 2018-04-10 PROCEDURE — 87808 TRICHOMONAS ASSAY W/OPTIC: CPT | Performed by: OBSTETRICS & GYNECOLOGY

## 2018-04-10 PROCEDURE — 87624 HPV HI-RISK TYP POOLED RSLT: CPT | Performed by: OBSTETRICS & GYNECOLOGY

## 2018-04-10 PROCEDURE — 99213 OFFICE O/P EST LOW 20 MIN: CPT | Performed by: OBSTETRICS & GYNECOLOGY

## 2018-04-10 PROCEDURE — 87106 FUNGI IDENTIFICATION YEAST: CPT | Performed by: OBSTETRICS & GYNECOLOGY

## 2018-04-10 PROCEDURE — 87205 SMEAR GRAM STAIN: CPT | Performed by: OBSTETRICS & GYNECOLOGY

## 2018-04-10 RX ORDER — METRONIDAZOLE 500 MG/1
500 TABLET ORAL
Qty: 28 TABLET | Refills: 0 | Status: SHIPPED | OUTPATIENT
Start: 2018-04-10 | End: 2018-04-24

## 2018-04-10 NOTE — PROGRESS NOTES
Jennyfer Wyatt is here for a checkup. She is complaining of vaginal discharge that has \"fishy\" odor. She been having this discharge for about 4 days. She has past history of bacterial vaginosis. Her periods are regular. Her  has had vasectomy. encounter.       PRESCRIPTIONS:       Signed Prescriptions Disp Refills    metRONIDAZOLE 500 MG Oral Tab 28 tablet 0      Sig: Take 1 tablet (500 mg total) by mouth 2 times daily after a meal.           IMAGING/ REFERRALS:      None     (N76.0) Vaginitis an

## 2018-05-02 ENCOUNTER — NURSE ONLY (OUTPATIENT)
Dept: FAMILY MEDICINE CLINIC | Facility: CLINIC | Age: 38
End: 2018-05-02

## 2018-05-02 DIAGNOSIS — Z00.8 ENCOUNTER FOR WORK CAPABILITY ASSESSMENT: Primary | ICD-10-CM

## 2018-05-02 PROCEDURE — 86580 TB INTRADERMAL TEST: CPT | Performed by: FAMILY MEDICINE

## 2018-05-02 NOTE — PROGRESS NOTES
Patient comes to office for need of TB skin test. TB done on L forearm. Patient tolerated well with no complications.  Advised TB needs to be read on Friday, patient will come before 4 pm.

## 2018-06-04 ENCOUNTER — TELEPHONE (OUTPATIENT)
Dept: OBGYN CLINIC | Facility: CLINIC | Age: 38
End: 2018-06-04

## 2018-06-04 RX ORDER — TINIDAZOLE 500 MG/1
2 TABLET ORAL
Qty: 8 TABLET | Refills: 0 | Status: SHIPPED | OUTPATIENT
Start: 2018-06-04 | End: 2018-09-11

## 2018-06-04 NOTE — TELEPHONE ENCOUNTER
Per pt, she has BV again. Pt was seen 2/27/18 and 4/10/18 and was given Flagyl and Clinda. Per Dr. Christian Duran, she would like pt to take Tindamax 2gm daily x 2 days. Let pt know and sent into pts pharmacy.

## 2018-07-05 ENCOUNTER — HOSPITAL ENCOUNTER (OUTPATIENT)
Age: 38
Discharge: HOME | End: 2018-07-05
Attending: FAMILY MEDICINE
Payer: COMMERCIAL

## 2018-07-05 VITALS
RESPIRATION RATE: 18 BRPM | OXYGEN SATURATION: 97 % | TEMPERATURE: 99 F | WEIGHT: 140 LBS | SYSTOLIC BLOOD PRESSURE: 102 MMHG | DIASTOLIC BLOOD PRESSURE: 62 MMHG | HEIGHT: 64 IN | BODY MASS INDEX: 23.9 KG/M2 | HEART RATE: 89 BPM

## 2018-07-05 DIAGNOSIS — B37.3 YEAST VAGINITIS: Primary | ICD-10-CM

## 2018-07-05 PROCEDURE — 87591 N.GONORRHOEAE DNA AMP PROB: CPT | Performed by: FAMILY MEDICINE

## 2018-07-05 PROCEDURE — 87808 TRICHOMONAS ASSAY W/OPTIC: CPT | Performed by: FAMILY MEDICINE

## 2018-07-05 PROCEDURE — 87205 SMEAR GRAM STAIN: CPT | Performed by: FAMILY MEDICINE

## 2018-07-05 PROCEDURE — 99204 OFFICE O/P NEW MOD 45 MIN: CPT

## 2018-07-05 PROCEDURE — 87106 FUNGI IDENTIFICATION YEAST: CPT | Performed by: FAMILY MEDICINE

## 2018-07-05 PROCEDURE — 87491 CHLMYD TRACH DNA AMP PROBE: CPT | Performed by: FAMILY MEDICINE

## 2018-07-05 RX ORDER — FLUCONAZOLE 150 MG/1
150 TABLET ORAL ONCE
Qty: 1 TABLET | Refills: 0 | Status: SHIPPED | OUTPATIENT
Start: 2018-07-05 | End: 2018-07-05

## 2018-07-05 NOTE — ED INITIAL ASSESSMENT (HPI)
Pt here with c/o yellow vaginal discharge with odor for a few days. Pt has some lower abdominal discomfort a few days ago. Denies n/v/d.  Denies any  complaints

## 2018-07-05 NOTE — ED PROVIDER NOTES
Pt seen in [address] [place] [address]      Stated Complaint: Patient presents with:  Jennifer-G (gynecologic)      --------------   RN assessment:    yellow vag dishcarge x I wk  --------------    CC:     HPI:  Meg Pantoja is a 40year old 89  Resp: 18  Temp: 98.8 °F (37.1 °C)  Temp src: Oral  SpO2: 97 %  O2 Device: None (Room air)  Physical Exam:   General :    Alert, cooperative, no distress, appears stated age   Head:    Normocephalic, without obvious abnormality, atraumatic   Eyes:    [address] [id number]  [phone]    Go to   As needed, If symptoms worsen, For re-check        Medications Prescribed:    Current Discharge Medication List    START taking these medications    fluconazole 150 MG Oral Tab  Take 1 tablet (1

## 2018-07-06 LAB
C TRACH DNA SPEC QL NAA+PROBE: NEGATIVE
N GONORRHOEA DNA SPEC QL NAA+PROBE: NEGATIVE

## 2018-07-07 LAB
GENITAL VAGINOSIS SCREEN: POSITIVE
TRICHOMONAS SCREEN: NEGATIVE

## 2018-07-07 NOTE — ED NOTES
Prescription for Metrogel called in to patient's pharmacy, [address] [place], [place] [phone].

## 2018-08-21 ENCOUNTER — TELEPHONE (OUTPATIENT)
Dept: OBGYN CLINIC | Facility: CLINIC | Age: 38
End: 2018-08-21

## 2018-08-21 RX ORDER — CLINDAMYCIN HYDROCHLORIDE 300 MG/1
300 CAPSULE ORAL 2 TIMES DAILY
Qty: 14 CAPSULE | Refills: 0 | Status: SHIPPED | OUTPATIENT
Start: 2018-08-21 | End: 2018-08-28

## 2018-08-21 NOTE — TELEPHONE ENCOUNTER
Spoke with pt c/o BV, was recently treated in 07/2018 by Urgent Care for + Bv. I explained to pt I will sent of clinda BID x 7 days but if symptoms still persist she have to bee seen by a doctor. Pt voiced understanding. Rx sent.

## 2018-09-05 ENCOUNTER — TELEPHONE (OUTPATIENT)
Dept: FAMILY MEDICINE CLINIC | Facility: CLINIC | Age: 38
End: 2018-09-05

## 2018-09-05 RX ORDER — ERGOCALCIFEROL 1.25 MG/1
50000 CAPSULE ORAL WEEKLY
Qty: 12 CAPSULE | Refills: 1 | Status: SHIPPED | OUTPATIENT
Start: 2018-09-05 | End: 2019-02-14

## 2018-09-11 ENCOUNTER — APPOINTMENT (OUTPATIENT)
Dept: LAB | Facility: REFERENCE LAB | Age: 38
End: 2018-09-11
Attending: FAMILY MEDICINE
Payer: COMMERCIAL

## 2018-09-11 ENCOUNTER — OFFICE VISIT (OUTPATIENT)
Dept: FAMILY MEDICINE CLINIC | Facility: CLINIC | Age: 38
End: 2018-09-11
Payer: COMMERCIAL

## 2018-09-11 VITALS
DIASTOLIC BLOOD PRESSURE: 70 MMHG | SYSTOLIC BLOOD PRESSURE: 116 MMHG | HEART RATE: 64 BPM | OXYGEN SATURATION: 98 % | HEIGHT: 64.5 IN | BODY MASS INDEX: 24.29 KG/M2 | WEIGHT: 144 LBS

## 2018-09-11 DIAGNOSIS — R73.01 ELEVATED FASTING GLUCOSE: ICD-10-CM

## 2018-09-11 DIAGNOSIS — Z86.19 HISTORY OF HERPES GENITALIS: ICD-10-CM

## 2018-09-11 DIAGNOSIS — R51.9 NEW ONSET HEADACHE: Primary | ICD-10-CM

## 2018-09-11 LAB — GLUCOSE SERPL-MCNC: 91 MG/DL (ref 70–99)

## 2018-09-11 PROCEDURE — 36415 COLL VENOUS BLD VENIPUNCTURE: CPT | Performed by: FAMILY MEDICINE

## 2018-09-11 PROCEDURE — 99214 OFFICE O/P EST MOD 30 MIN: CPT | Performed by: FAMILY MEDICINE

## 2018-09-11 PROCEDURE — 82947 ASSAY GLUCOSE BLOOD QUANT: CPT | Performed by: FAMILY MEDICINE

## 2018-09-11 RX ORDER — VALACYCLOVIR HYDROCHLORIDE 1 G/1
1 TABLET, FILM COATED ORAL DAILY
Qty: 90 TABLET | Refills: 2 | Status: SHIPPED | OUTPATIENT
Start: 2018-09-11 | End: 2019-03-12

## 2018-09-11 RX ORDER — ALBUTEROL SULFATE 90 UG/1
2 AEROSOL, METERED RESPIRATORY (INHALATION)
COMMUNITY
Start: 2017-09-21 | End: 2019-04-02

## 2018-09-11 NOTE — PROGRESS NOTES
CC:  Patient presents with:  Blurred Vision: states blurred vision and headache  has been going on constantly for the past 5 days, states she went to an eye doctor and her exam was normal and was suggest to f/u with pcp  Lab: f/u on pre diabetes  Medicatio or vomiting, no diarrhea, no abdominal pain  Dermatologic:  No rashes  Neuro: headache with left leg heaviness, dizziness and lightheadedness x 1 episode, no slurred speech or facial droop, no numbness or tingling     Past Medical History:   Diagnosis Date MOUTH ONCE A WEEK. Disp: 12 capsule Rfl: 1   ValACYclovir HCl 1 G Oral Tab Take 1,000 mg by mouth. Disp:  Rfl:        Patient has no known allergies.       Vitals:    09/11/18  1148   BP: 116/70   Pulse: 64   SpO2: 98%   Weight: 144 lb   Height: 64.5\"

## 2018-10-04 ENCOUNTER — OFFICE VISIT (OUTPATIENT)
Dept: OBGYN CLINIC | Facility: CLINIC | Age: 38
End: 2018-10-04
Payer: COMMERCIAL

## 2018-10-04 VITALS — SYSTOLIC BLOOD PRESSURE: 108 MMHG | DIASTOLIC BLOOD PRESSURE: 78 MMHG

## 2018-10-04 DIAGNOSIS — N76.0 VAGINITIS AND VULVOVAGINITIS: Primary | ICD-10-CM

## 2018-10-04 PROCEDURE — 87106 FUNGI IDENTIFICATION YEAST: CPT | Performed by: OBSTETRICS & GYNECOLOGY

## 2018-10-04 PROCEDURE — 87808 TRICHOMONAS ASSAY W/OPTIC: CPT | Performed by: OBSTETRICS & GYNECOLOGY

## 2018-10-04 PROCEDURE — 87205 SMEAR GRAM STAIN: CPT | Performed by: OBSTETRICS & GYNECOLOGY

## 2018-10-04 PROCEDURE — 99213 OFFICE O/P EST LOW 20 MIN: CPT | Performed by: OBSTETRICS & GYNECOLOGY

## 2018-10-04 RX ORDER — METRONIDAZOLE 500 MG/1
500 TABLET ORAL
Qty: 14 TABLET | Refills: 2 | Status: SHIPPED | OUTPATIENT
Start: 2018-10-04 | End: 2018-10-11

## 2018-10-04 RX ORDER — METRONIDAZOLE 7.5 MG/G
1 GEL VAGINAL NIGHTLY
Qty: 1 TUBE | Refills: 4 | Status: SHIPPED | OUTPATIENT
Start: 2018-10-04 | End: 2018-10-11

## 2018-10-04 NOTE — PROGRESS NOTES
Aysemary Gaviria is here for a checkup. Patient says that she has been having vaginal discharge and itching for past 2 days. She has previous history of bacterial vaginosis. She denies any fever, chills, lower abdominal pain.     Patient's  has had vasec bacterial vaginosis. PLAN:     Patient says that usually she gets bacterial vaginosis type of symptoms few days before she gets her period.     I want to order metronidazole orally for her and any future prodromal symptoms patient is going to use [facility]

## 2018-10-09 ENCOUNTER — HOSPITAL ENCOUNTER (OUTPATIENT)
Dept: MRI IMAGING | Facility: HOSPITAL | Age: 38
Discharge: HOME | End: 2018-10-09
Attending: FAMILY MEDICINE
Payer: COMMERCIAL

## 2018-10-09 DIAGNOSIS — R51.9 NEW ONSET HEADACHE: ICD-10-CM

## 2018-10-09 PROCEDURE — 70551 MRI BRAIN STEM W/O DYE: CPT | Performed by: FAMILY MEDICINE

## 2019-01-24 ENCOUNTER — TELEPHONE (OUTPATIENT)
Dept: OBGYN CLINIC | Facility: CLINIC | Age: 39
End: 2019-01-24

## 2019-01-24 NOTE — TELEPHONE ENCOUNTER
Pt asking what was the last medication called in for her for BV. I named all medications and pt states non of them are the ones she is looking for. Pt having same bv issue and looking for a certain medication. Pt believes name starts with a T.  Please call

## 2019-01-24 NOTE — TELEPHONE ENCOUNTER
Spoke with pt who states she'd like to have PO med prescribed previously by Casey Chong. Pt denied it being Clindamycin nor metronidazole. Advised pt to make appmnt with  to be evaluated. Pt stated \"it's always the same thing\".  Offered pt to [facility]ger

## 2019-01-29 ENCOUNTER — OFFICE VISIT (OUTPATIENT)
Dept: FAMILY MEDICINE CLINIC | Facility: CLINIC | Age: 39
End: 2019-01-29
Payer: COMMERCIAL

## 2019-01-29 VITALS
SYSTOLIC BLOOD PRESSURE: 112 MMHG | HEIGHT: 64.5 IN | OXYGEN SATURATION: 98 % | HEART RATE: 82 BPM | DIASTOLIC BLOOD PRESSURE: 78 MMHG | BODY MASS INDEX: 25.64 KG/M2 | WEIGHT: 152 LBS

## 2019-01-29 DIAGNOSIS — B37.9 CANDIDIASIS: ICD-10-CM

## 2019-01-29 DIAGNOSIS — T78.1XXA FOOD SENSITIVITY WITH GASTROINTESTINAL SYMPTOMS: ICD-10-CM

## 2019-01-29 DIAGNOSIS — R51.9 NEW ONSET HEADACHE: Primary | ICD-10-CM

## 2019-01-29 DIAGNOSIS — R14.0 BLOATING: ICD-10-CM

## 2019-01-29 DIAGNOSIS — G93.5 CHIARI I MALFORMATION (HCC): ICD-10-CM

## 2019-01-29 DIAGNOSIS — E66.3 OVERWEIGHT (BMI 25.0-29.9): ICD-10-CM

## 2019-01-29 DIAGNOSIS — H53.9 CHANGE IN VISION: ICD-10-CM

## 2019-01-29 PROCEDURE — 99214 OFFICE O/P EST MOD 30 MIN: CPT | Performed by: FAMILY MEDICINE

## 2019-01-29 NOTE — PROGRESS NOTES
Lam Duran is a 45year old female. Patient presents with: Follow - Up: regarding the Candida      HPI:     Taking Candisol, Caprylic acid and probiotics    Stomach is getting worse.     Eating dairy products and bread products which makes her symptom Occupational History      Not on file    Tobacco Use      Smoking status: Never Smoker      Smokeless tobacco: Never Used    Substance and Sexual Activity      Alcohol use: No        Comment: socially      Drug use: No      Sexual activity: Yes        Pa Continue diet as previously discussed. Counseled on further supplement recommendations in detail. Referred to neuro and ophtho for further evaluation  Referred for MNT to support and guide diet plan and lifestyle changes.     Given further recommendation Start taking 1/8 tsp daily and double the dose every 7 days until taking 1 tsp three times daily. Mix in liquid of choice. Reduce to lowest dose tolerated if any reactions occur. Buy it online at  http://Lock[employer]/ or at the [address]

## 2019-01-29 NOTE — PATIENT INSTRUCTIONS
The products and items listed below (the “Products”)  and their claims have not been evaluated by the Food and Drug Administration. Dietary products are not intended to treat, prevent, mitigate or cure disease.  Ultimately, you must draw your own conclusion Why: to improve microflora balance in the system, to kill of yeast and bacteria that are not of benefit to the body.    Directions: 1 tablet twice daily       Where: [employer] or [employer]

## 2019-02-12 ENCOUNTER — OFFICE VISIT (OUTPATIENT)
Dept: INTEGRATIVE MEDICINE | Facility: CLINIC | Age: 39
End: 2019-02-12
Payer: COMMERCIAL

## 2019-02-12 DIAGNOSIS — T78.1XXA FOOD SENSITIVITY WITH GASTROINTESTINAL SYMPTOMS: ICD-10-CM

## 2019-02-12 DIAGNOSIS — B37.9 CANDIDIASIS: ICD-10-CM

## 2019-02-12 DIAGNOSIS — E66.3 OVERWEIGHT (BMI 25.0-29.9): ICD-10-CM

## 2019-02-12 DIAGNOSIS — R14.0 BLOATING: ICD-10-CM

## 2019-02-12 PROCEDURE — 97802 MEDICAL NUTRITION INDIV IN: CPT | Performed by: NUTRITIONIST

## 2019-02-12 NOTE — PATIENT INSTRUCTIONS
The products and items listed below (the “Products”)  and their claims have not been evaluated by the Food and Drug Administration. Dietary products are not intended to treat, prevent, mitigate or cure disease.  Ultimately, you must draw your own conclusi [place]  · [employer]. com  ___________  For any questions or concerns you may have, please do not hesitate to contact via   My Chart OR email- [email or web address]

## 2019-02-12 NOTE — PROGRESS NOTES
Patient referred by Dr. Finn Cuevas  Did patient complete Nutritional Therapy Questionnaire?  Alfred Benjamin is a 45year old female presenting for medical nutrition therapy in regards to candida overgrowth, eat better overall that will result in weight l Weight taken    Future considerations: take measurements and educate on healthy oils to cook with     Follow up in 2 weeks    Dietary Supplements:   None suggested at today's visit    MICHAEL Whittaker  2/12/2019  1:49 PM

## 2019-02-27 ENCOUNTER — OFFICE VISIT (OUTPATIENT)
Dept: OBGYN CLINIC | Facility: CLINIC | Age: 39
End: 2019-02-27
Payer: COMMERCIAL

## 2019-02-27 VITALS
HEIGHT: 64.5 IN | SYSTOLIC BLOOD PRESSURE: 112 MMHG | BODY MASS INDEX: 26.16 KG/M2 | DIASTOLIC BLOOD PRESSURE: 80 MMHG | WEIGHT: 155.13 LBS

## 2019-02-27 DIAGNOSIS — N89.8 VAGINAL DISCHARGE: Primary | ICD-10-CM

## 2019-02-27 PROCEDURE — 99214 OFFICE O/P EST MOD 30 MIN: CPT | Performed by: OBSTETRICS & GYNECOLOGY

## 2019-02-27 PROCEDURE — 87106 FUNGI IDENTIFICATION YEAST: CPT | Performed by: OBSTETRICS & GYNECOLOGY

## 2019-02-27 PROCEDURE — 87205 SMEAR GRAM STAIN: CPT | Performed by: OBSTETRICS & GYNECOLOGY

## 2019-02-27 PROCEDURE — 87808 TRICHOMONAS ASSAY W/OPTIC: CPT | Performed by: OBSTETRICS & GYNECOLOGY

## 2019-02-27 RX ORDER — GABAPENTIN 100 MG/1
CAPSULE ORAL
COMMUNITY
Start: 2019-02-13 | End: 2020-01-02

## 2019-02-27 RX ORDER — TINIDAZOLE 500 MG/1
TABLET ORAL
Qty: 8 TABLET | Refills: 0 | Status: SHIPPED | OUTPATIENT
Start: 2019-02-27 | End: 2019-03-20 | Stop reason: ALTCHOICE

## 2019-02-27 NOTE — PROGRESS NOTES
Flaco Rivers is a 45year old female.     HPI:   Patient presents with:  Vaginal Problem: vaginal discharge/irritation     Here with recurrent BV and odor -- extensive review of chart and labs done over past year show cultures positive for BV with no yeas

## 2019-03-01 LAB
GENITAL VAGINOSIS SCREEN: POSITIVE
TRICHOMONAS SCREEN: NEGATIVE

## 2019-03-01 NOTE — PROGRESS NOTES
Message sent to Pt's active mychart with Dr Sheila Frias above message about test results. Patient to call or e-mail the office if she has any questions or concerns.

## 2019-03-04 ENCOUNTER — TELEPHONE (OUTPATIENT)
Dept: OBGYN CLINIC | Facility: CLINIC | Age: 39
End: 2019-03-04

## 2019-03-04 NOTE — TELEPHONE ENCOUNTER
----- Message from Jose Francisco MD sent at 3/1/2019 10:50 AM CST -----  Yeast negative.       Jose Francisco MD

## 2019-03-12 RX ORDER — VALACYCLOVIR HYDROCHLORIDE 1 G/1
1 TABLET, FILM COATED ORAL DAILY
Qty: 90 TABLET | Refills: 2 | OUTPATIENT
Start: 2019-03-12

## 2019-03-12 RX ORDER — VALACYCLOVIR HYDROCHLORIDE 1 G/1
1 TABLET, FILM COATED ORAL DAILY
Qty: 90 TABLET | Refills: 2 | Status: SHIPPED | OUTPATIENT
Start: 2019-03-12 | End: 2020-06-21

## 2019-03-20 ENCOUNTER — OFFICE VISIT (OUTPATIENT)
Dept: OBGYN CLINIC | Facility: CLINIC | Age: 39
End: 2019-03-20
Payer: COMMERCIAL

## 2019-03-20 VITALS
SYSTOLIC BLOOD PRESSURE: 116 MMHG | HEIGHT: 64.5 IN | DIASTOLIC BLOOD PRESSURE: 70 MMHG | WEIGHT: 155 LBS | BODY MASS INDEX: 26.14 KG/M2

## 2019-03-20 DIAGNOSIS — N89.8 VAGINAL DISCHARGE: Primary | ICD-10-CM

## 2019-03-20 PROCEDURE — 87808 TRICHOMONAS ASSAY W/OPTIC: CPT | Performed by: OBSTETRICS & GYNECOLOGY

## 2019-03-20 PROCEDURE — 99213 OFFICE O/P EST LOW 20 MIN: CPT | Performed by: OBSTETRICS & GYNECOLOGY

## 2019-03-20 PROCEDURE — 87205 SMEAR GRAM STAIN: CPT | Performed by: OBSTETRICS & GYNECOLOGY

## 2019-03-20 PROCEDURE — 87106 FUNGI IDENTIFICATION YEAST: CPT | Performed by: OBSTETRICS & GYNECOLOGY

## 2019-03-20 RX ORDER — ERGOCALCIFEROL 1.25 MG/1
CAPSULE ORAL
COMMUNITY
Start: 2019-03-12 | End: 2019-04-02 | Stop reason: ALTCHOICE

## 2019-03-20 NOTE — PROGRESS NOTES
Hodan Crespo is a 45year old female. HPI:   Patient presents with:  Vaginal Problem: pt c/o vaginal odor and discharge       Recent treatment with   Ttindamx for BV 2/27 that has recurred since LMP 3/13/19 with increased odor and discharge.  Discussed

## 2019-03-22 ENCOUNTER — TELEPHONE (OUTPATIENT)
Dept: OBGYN CLINIC | Facility: CLINIC | Age: 39
End: 2019-03-22

## 2019-03-22 RX ORDER — CLINDAMYCIN HYDROCHLORIDE 300 MG/1
300 CAPSULE ORAL 2 TIMES DAILY
Qty: 14 CAPSULE | Refills: 0 | Status: SHIPPED | OUTPATIENT
Start: 2019-03-22 | End: 2019-03-29

## 2019-03-24 LAB
GENITAL VAGINOSIS SCREEN: POSITIVE
TRICHOMONAS SCREEN: NEGATIVE

## 2019-03-25 ENCOUNTER — TELEPHONE (OUTPATIENT)
Dept: OBGYN CLINIC | Facility: CLINIC | Age: 39
End: 2019-03-25

## 2019-03-25 RX ORDER — CLINDAMYCIN HYDROCHLORIDE 300 MG/1
300 CAPSULE ORAL 2 TIMES DAILY
Qty: 14 CAPSULE | Refills: 0 | Status: CANCELLED | OUTPATIENT
Start: 2019-03-25 | End: 2019-04-01

## 2019-03-25 NOTE — TELEPHONE ENCOUNTER
----- Message from Leana Heller MD sent at 3/22/2019  8:17 AM CDT -----  Your lab results show BV.    Please complete oral Clindamycin 300 mg twice daily x 7d      Cathi Torrez MD

## 2019-03-25 NOTE — TELEPHONE ENCOUNTER
Lm for patient to call back RE: test results. Pt was treated already Medication Detail      Disp Refills Start End    Clindamycin HCl 300 MG Oral Cap 14 capsule 0 3/22/2019 3/29/2019    Sig - Route:  Take 1 capsule (300 mg total) by mouth 2 (two) times caden

## 2019-04-02 ENCOUNTER — OFFICE VISIT (OUTPATIENT)
Dept: FAMILY MEDICINE CLINIC | Facility: CLINIC | Age: 39
End: 2019-04-02
Payer: COMMERCIAL

## 2019-04-02 ENCOUNTER — LAB ENCOUNTER (OUTPATIENT)
Dept: LAB | Facility: REFERENCE LAB | Age: 39
End: 2019-04-02
Attending: FAMILY MEDICINE
Payer: COMMERCIAL

## 2019-04-02 VITALS
HEIGHT: 64.5 IN | WEIGHT: 148 LBS | OXYGEN SATURATION: 98 % | DIASTOLIC BLOOD PRESSURE: 64 MMHG | SYSTOLIC BLOOD PRESSURE: 118 MMHG | BODY MASS INDEX: 24.96 KG/M2 | HEART RATE: 80 BPM

## 2019-04-02 DIAGNOSIS — Z00.01 ENCOUNTER FOR ROUTINE ADULT HEALTH EXAMINATION WITH ABNORMAL FINDINGS: ICD-10-CM

## 2019-04-02 DIAGNOSIS — J45.20 MILD INTERMITTENT ASTHMA WITHOUT COMPLICATION: ICD-10-CM

## 2019-04-02 DIAGNOSIS — E55.9 VITAMIN D DEFICIENCY: ICD-10-CM

## 2019-04-02 DIAGNOSIS — Z00.01 ENCOUNTER FOR ROUTINE ADULT HEALTH EXAMINATION WITH ABNORMAL FINDINGS: Primary | ICD-10-CM

## 2019-04-02 PROBLEM — N89.8 VAGINAL DISCHARGE: Status: RESOLVED | Noted: 2017-11-16 | Resolved: 2019-04-02

## 2019-04-02 PROCEDURE — 80053 COMPREHEN METABOLIC PANEL: CPT

## 2019-04-02 PROCEDURE — 99395 PREV VISIT EST AGE 18-39: CPT | Performed by: FAMILY MEDICINE

## 2019-04-02 PROCEDURE — 36415 COLL VENOUS BLD VENIPUNCTURE: CPT

## 2019-04-02 PROCEDURE — 85025 COMPLETE CBC W/AUTO DIFF WBC: CPT

## 2019-04-02 PROCEDURE — 82306 VITAMIN D 25 HYDROXY: CPT

## 2019-04-02 PROCEDURE — 80061 LIPID PANEL: CPT

## 2019-04-02 RX ORDER — ALBUTEROL SULFATE 90 UG/1
2 AEROSOL, METERED RESPIRATORY (INHALATION) EVERY 6 HOURS PRN
Qty: 1 INHALER | Refills: 1 | Status: SHIPPED | OUTPATIENT
Start: 2019-04-02 | End: 2020-04-04

## 2019-04-02 NOTE — PATIENT INSTRUCTIONS
Prevention Guidelines, Women Ages 25 to 44  Screening tests and vaccines are an important part of managing your health. A screening test is done to find possible disorders or diseases in people who don't have any symptoms.  The goal is to find a disease e Type 2 diabetes All women with prediabetes Every year   Gonorrhea Sexually active women at increased risk for infection At routine exams   Hepatitis C Anyone at increased risk At routine exams   HIV All women should be tested at least once for HIV between Meningococcal Women at increased risk for infection should talk with their healthcare provider 1 or more doses   Pneumococcal conjugate vaccine (PCV13) and pneumococcal polysaccharide vaccine (PPSV23) Women at increased risk for infection should talk with © 7039-5502 [address] [facility] [place] [place]. All rights reserved. This information is not intended as a substitute for professional medical care. Always follow your healthcare professional's instructions.

## 2019-04-02 NOTE — PROGRESS NOTES
HPI:   Miriam Anne is a 45year old female who presents for a complete physical exam.     Last pap: 4/2018 and normal   Menses: Irregular menstrual cycles over the past year with more spotting and frequent bleeding   Contraception:  Vasectomy     Histor 11/14/2017    Performed by Samuel Burgess MD at [address] [place] [address] ENDOSCOPY   • COLPOSCOPY, CERVIX W/UPPER ADJACENT VAGINA; W/BIOPSY(S), CERVIX     • ESOPHAGOGASTRODUODENOSCOPY (EGD) N/A 11/14/2017    Performed by Samuel Burgess MD at [address] [place] [address] ENDOSCOPY   • SAUL without complication  Vitamin d deficiency  Orders Placed This Encounter      Vitamin D, 25-Hydroxy [E]      CBC W Differential W Platelet [E]      Lipid Panel [E]      Comp Metabolic Panel (14) [E]    ACT score of 19 but symptoms and Albuterol use trigger

## 2019-04-03 DIAGNOSIS — E55.9 VITAMIN D DEFICIENCY: Primary | ICD-10-CM

## 2019-04-09 ENCOUNTER — TELEPHONE (OUTPATIENT)
Dept: OBGYN CLINIC | Facility: CLINIC | Age: 39
End: 2019-04-09

## 2019-04-09 RX ORDER — CLINDAMYCIN HYDROCHLORIDE 300 MG/1
300 CAPSULE ORAL 2 TIMES DAILY
Qty: 14 CAPSULE | Refills: 0 | Status: SHIPPED | OUTPATIENT
Start: 2019-04-09 | End: 2019-04-16

## 2019-04-09 NOTE — TELEPHONE ENCOUNTER
Spoke with pt who finished course of Kenya Baeza recently for +BV per below. Pt states she has had a fishy odor and increased vag discharge since Sunday.  Pt has not started with recommendation from EB during last OV on 3/20/19 \"Discussed post-coital prophylaxi

## 2019-04-09 NOTE — TELEPHONE ENCOUNTER
Spoke with pt and relayed EB's msg to pt. Pt will need to be seen if sx not resolved after second round of Clinda. Med ordered. Pt verbalized understanding and agrees.

## 2019-05-02 ENCOUNTER — OFFICE VISIT (OUTPATIENT)
Dept: OBGYN CLINIC | Facility: CLINIC | Age: 39
End: 2019-05-02
Payer: COMMERCIAL

## 2019-05-02 VITALS
WEIGHT: 147 LBS | SYSTOLIC BLOOD PRESSURE: 116 MMHG | BODY MASS INDEX: 24.79 KG/M2 | HEIGHT: 64.5 IN | DIASTOLIC BLOOD PRESSURE: 80 MMHG

## 2019-05-02 DIAGNOSIS — N76.0 VAGINITIS AND VULVOVAGINITIS: Primary | ICD-10-CM

## 2019-05-02 PROCEDURE — 87106 FUNGI IDENTIFICATION YEAST: CPT | Performed by: OBSTETRICS & GYNECOLOGY

## 2019-05-02 PROCEDURE — 87205 SMEAR GRAM STAIN: CPT | Performed by: OBSTETRICS & GYNECOLOGY

## 2019-05-02 PROCEDURE — 99213 OFFICE O/P EST LOW 20 MIN: CPT | Performed by: OBSTETRICS & GYNECOLOGY

## 2019-05-02 PROCEDURE — 87808 TRICHOMONAS ASSAY W/OPTIC: CPT | Performed by: OBSTETRICS & GYNECOLOGY

## 2019-05-02 RX ORDER — RESVER/WINE/BFL/GRPSD/PC/C/POM 200MG-60MG
CAPSULE ORAL
Refills: 0 | COMMUNITY
Start: 2019-04-04 | End: 2020-04-08

## 2019-05-02 NOTE — PROGRESS NOTES
CC: Patient is here for vaginal complaints. HPI: Patient is a 45year old  for white vaginal discharge with odor which comes and goes. She received clinda 2 x since - last time about a month ago. No itch.  She has not had any discharge for 2 day Food insecurity:        Worry: Not on file        Inability: Not on file      Transportation needs:        Medical: Not on file        Non-medical: Not on file    Tobacco Use      Smoking status: Never Smoker      Smokeless tobacco: Never Used    Substance enlargement. No palpable hernias  GYNE/:  External Genitalia: Normal appearing, no lesions, normal hair distribution   Urethral meatus appear wnl, no abnormal discharge or lesions noted.    Bladder: well supported, urethra wnl, no palpable tenderness or m

## 2019-05-03 RX ORDER — CLINDAMYCIN HYDROCHLORIDE 300 MG/1
300 CAPSULE ORAL 2 TIMES DAILY
Qty: 14 CAPSULE | Refills: 1 | Status: SHIPPED | OUTPATIENT
Start: 2019-05-03 | End: 2019-07-14

## 2019-07-01 ENCOUNTER — HOSPITAL ENCOUNTER (OUTPATIENT)
Age: 39
Discharge: ANOTHER HOSPITAL | End: 2019-07-01
Attending: FAMILY MEDICINE
Payer: COMMERCIAL

## 2019-07-01 VITALS
HEART RATE: 79 BPM | HEIGHT: 64 IN | OXYGEN SATURATION: 99 % | BODY MASS INDEX: 23.56 KG/M2 | DIASTOLIC BLOOD PRESSURE: 77 MMHG | WEIGHT: 138 LBS | SYSTOLIC BLOOD PRESSURE: 110 MMHG | TEMPERATURE: 98 F | RESPIRATION RATE: 18 BRPM

## 2019-07-01 DIAGNOSIS — R10.9 ABDOMINAL PAIN OF UNKNOWN ETIOLOGY: Primary | ICD-10-CM

## 2019-07-01 DIAGNOSIS — R19.7 DIARRHEA, UNSPECIFIED TYPE: ICD-10-CM

## 2019-07-01 PROCEDURE — 99212 OFFICE O/P EST SF 10 MIN: CPT

## 2019-07-01 NOTE — ED PROVIDER NOTES
[address]: [address]    History   Patient presents with:  Abdomen/Flank Pain (GI/)    Stated Complaint: abdominal pain; diarrhea    HPI    Here with symptoms of abdominal pain and diarrhea.   Per patient diarrhea cm (5' 4\")   Wt 62.6 kg   LMP 06/24/2019   SpO2 99%   BMI 23.69 kg/m²         Physical Exam   Constitutional: She is oriented to person, place, and time. She appears well-developed and well-nourished. Non-toxic appearance. She does not appear ill.    MARTINE

## 2019-07-01 NOTE — ED INITIAL ASSESSMENT (HPI)
Per pt having intermittent diarrhea for 2 weeks with abdominal pain that has been constant. Denies any urinary symptoms report decreased appetite denies any nausea or vomiting.

## 2019-07-17 RX ORDER — CLINDAMYCIN HYDROCHLORIDE 300 MG/1
300 CAPSULE ORAL 2 TIMES DAILY
Qty: 14 CAPSULE | Refills: 1 | Status: SHIPPED | OUTPATIENT
Start: 2019-07-17 | End: 2019-09-26

## 2019-09-30 RX ORDER — CLINDAMYCIN HYDROCHLORIDE 300 MG/1
300 CAPSULE ORAL 2 TIMES DAILY
Qty: 14 CAPSULE | Refills: 1 | Status: SHIPPED | OUTPATIENT
Start: 2019-09-30 | End: 2019-10-07

## 2019-12-03 ENCOUNTER — TELEPHONE (OUTPATIENT)
Dept: FAMILY MEDICINE CLINIC | Facility: CLINIC | Age: 39
End: 2019-12-03

## 2020-01-02 ENCOUNTER — OFFICE VISIT (OUTPATIENT)
Dept: FAMILY MEDICINE CLINIC | Facility: CLINIC | Age: 40
End: 2020-01-02
Payer: COMMERCIAL

## 2020-01-02 VITALS
OXYGEN SATURATION: 99 % | HEIGHT: 64 IN | TEMPERATURE: 98 F | DIASTOLIC BLOOD PRESSURE: 80 MMHG | RESPIRATION RATE: 16 BRPM | WEIGHT: 149 LBS | HEART RATE: 105 BPM | SYSTOLIC BLOOD PRESSURE: 110 MMHG | BODY MASS INDEX: 25.44 KG/M2

## 2020-01-02 DIAGNOSIS — G43.009 MIGRAINE WITHOUT AURA AND WITHOUT STATUS MIGRAINOSUS, NOT INTRACTABLE: Primary | ICD-10-CM

## 2020-01-02 PROCEDURE — 99214 OFFICE O/P EST MOD 30 MIN: CPT | Performed by: FAMILY MEDICINE

## 2020-01-02 RX ORDER — PROPRANOLOL HYDROCHLORIDE 20 MG/1
20 TABLET ORAL 2 TIMES DAILY
Qty: 60 TABLET | Refills: 0 | Status: SHIPPED | OUTPATIENT
Start: 2020-01-02 | End: 2020-01-30

## 2020-01-02 NOTE — PATIENT INSTRUCTIONS
Preventing Migraine Headaches: Triggers     Red wine is a common migraine trigger. The first step in preventing migraines is to learn what triggers them. You may then be able to control your triggers to avoid or reduce the severity of your migraines. · Change your behavior at times when triggers can't be avoided. For example, make sure to get enough rest and drink plenty of water while you're traveling. Make sure to carry a hat, sunglasses, and your medicines.  Be alert for migraine symptoms, so you can

## 2020-01-02 NOTE — PROGRESS NOTES
HPI:    Patient ID: Reshma Grayson is a 44year old female who presents for migraine. DANII  Has a longstanding hx of migraines for many years, maybe since childhood. Has been having migraines off and on recently. Sometimes gets migraine once weekly. palpitations and leg swelling. Gastrointestinal: Negative for nausea, vomiting, abdominal pain and diarrhea. Skin: Negative for pallor and rash. Neurological: Positive for headaches.  Negative for dizziness, tremors, seizures, syncope, facial asymmetr Psychiatric: She has a normal mood and affect. Judgment normal.          ASSESSMENT/PLAN:   Migraine without aura and without status migrainosus, not intractable  (primary encounter diagnosis)    1.  Migraine without aura and without status migrainosus, n

## 2020-01-28 ENCOUNTER — OFFICE VISIT (OUTPATIENT)
Dept: FAMILY MEDICINE CLINIC | Facility: CLINIC | Age: 40
End: 2020-01-28
Payer: COMMERCIAL

## 2020-01-28 VITALS
OXYGEN SATURATION: 98 % | SYSTOLIC BLOOD PRESSURE: 112 MMHG | DIASTOLIC BLOOD PRESSURE: 76 MMHG | HEIGHT: 64 IN | WEIGHT: 149 LBS | HEART RATE: 92 BPM | BODY MASS INDEX: 25.44 KG/M2

## 2020-01-28 DIAGNOSIS — H10.12 ALLERGIC CONJUNCTIVITIS OF LEFT EYE: ICD-10-CM

## 2020-01-28 DIAGNOSIS — G43.009 MIGRAINE WITHOUT AURA AND WITHOUT STATUS MIGRAINOSUS, NOT INTRACTABLE: Primary | ICD-10-CM

## 2020-01-28 PROCEDURE — 99213 OFFICE O/P EST LOW 20 MIN: CPT | Performed by: FAMILY MEDICINE

## 2020-01-28 RX ORDER — CROMOLYN SODIUM 40 MG/ML
1 SOLUTION/ DROPS OPHTHALMIC 4 TIMES DAILY
Qty: 1 BOTTLE | Refills: 0 | Status: SHIPPED | OUTPATIENT
Start: 2020-01-28 | End: 2020-04-08

## 2020-01-28 NOTE — PATIENT INSTRUCTIONS
-Increase your Propranolol to 20 mg twice a day and if still having migraines frequently or they are severe, notify me in a few weeks to increase the dose further  Migraine Headache: Stages and Treatment    A migraine headache tends to progress in stages. Work with your healthcare provider to find the right medicines for you. Medicines for migraine may relieve pain (analgesics), relieve nausea, or attack the migraine's root causes (migraine-specific medicines).   Rebound headache  Taking analgesics each day,

## 2020-01-28 NOTE — PROGRESS NOTES
CC:  Patient presents with:  Paperwork: fmla papers      HPI: 44year old female here to follow-up on migraines and requesting FMLA paperwork. Works for [employer] and [employer] services and does a lot of home visits and travels a lot in her car.   The migrain Sexual Activity      Alcohol use: No        Comment: socially      Drug use: No      Sexual activity: Yes        Partners: Male    Lifestyle      Physical activity:        Days per week: Not on file        Minutes per session: Not on file      Stress: Not 01/28/20  1355   BP: 112/76   Pulse: 92   SpO2: 98%   Weight: 149 lb (67.6 kg)   Height: 64\"       Body mass index is 25.58 kg/m².     Physical:  General:  Alert, appropriate, no acute distress   HEENT: supple, no tonsillar erythema or exudate, no lymph

## 2020-01-30 RX ORDER — PROPRANOLOL HYDROCHLORIDE 20 MG/1
TABLET ORAL
Qty: 60 TABLET | Refills: 0 | Status: SHIPPED | OUTPATIENT
Start: 2020-01-30 | End: 2020-03-05

## 2020-02-04 ENCOUNTER — OFFICE VISIT (OUTPATIENT)
Dept: OBGYN CLINIC | Facility: CLINIC | Age: 40
End: 2020-02-04
Payer: COMMERCIAL

## 2020-02-04 VITALS
HEIGHT: 64 IN | SYSTOLIC BLOOD PRESSURE: 110 MMHG | WEIGHT: 148 LBS | BODY MASS INDEX: 25.27 KG/M2 | DIASTOLIC BLOOD PRESSURE: 76 MMHG

## 2020-02-04 DIAGNOSIS — R10.2 PELVIC PAIN IN FEMALE: Primary | ICD-10-CM

## 2020-02-04 PROBLEM — N76.0 VAGINITIS AND VULVOVAGINITIS: Status: RESOLVED | Noted: 2018-10-04 | Resolved: 2020-02-04

## 2020-02-04 LAB
APPEARANCE: CLEAR
BILIRUBIN: NEGATIVE
GLUCOSE (URINE DIPSTICK): NEGATIVE MG/DL
KETONES (URINE DIPSTICK): 15 MG/DL
LEUKOCYTES: NEGATIVE
MULTISTIX EXPIRATION DATE: NORMAL DATE
MULTISTIX LOT#: NORMAL NUMERIC
NITRITE, URINE: NEGATIVE
OCCULT BLOOD: NEGATIVE
PH, URINE: 5.5 (ref 4.5–8)
PROTEIN (URINE DIPSTICK): NEGATIVE MG/DL
SPECIFIC GRAVITY: 1.02 (ref 1–1.03)
URINE-COLOR: YELLOW
UROBILINOGEN,SEMI-QN: 0.2 MG/DL (ref 0–1.9)

## 2020-02-04 PROCEDURE — 87591 N.GONORRHOEAE DNA AMP PROB: CPT | Performed by: OBSTETRICS & GYNECOLOGY

## 2020-02-04 PROCEDURE — 81002 URINALYSIS NONAUTO W/O SCOPE: CPT | Performed by: OBSTETRICS & GYNECOLOGY

## 2020-02-04 PROCEDURE — 87205 SMEAR GRAM STAIN: CPT | Performed by: OBSTETRICS & GYNECOLOGY

## 2020-02-04 PROCEDURE — 87086 URINE CULTURE/COLONY COUNT: CPT | Performed by: OBSTETRICS & GYNECOLOGY

## 2020-02-04 PROCEDURE — 99214 OFFICE O/P EST MOD 30 MIN: CPT | Performed by: OBSTETRICS & GYNECOLOGY

## 2020-02-04 PROCEDURE — 87491 CHLMYD TRACH DNA AMP PROBE: CPT | Performed by: OBSTETRICS & GYNECOLOGY

## 2020-02-04 PROCEDURE — 87808 TRICHOMONAS ASSAY W/OPTIC: CPT | Performed by: OBSTETRICS & GYNECOLOGY

## 2020-02-04 PROCEDURE — 87106 FUNGI IDENTIFICATION YEAST: CPT | Performed by: OBSTETRICS & GYNECOLOGY

## 2020-02-04 NOTE — PROGRESS NOTES
CC: Patient is here for RLQ pain. HPI: Patient is a 44year old  for RLQ abdominal petey which started about 1 W  just prior to her cycle, which increased during her cycle and then continued after off/on for the following week.  . She took ibuprofe excessive urination, no excessive thirst  ALLERGIES:  No history of asthma, no hives, no eczema, no rhinitis. Patient's last menstrual period was 2020.     OB History    Para Term  AB Living   2 2 1 1   2   SAB TAB Ectopic Multiple Beverlyn Eaves Tobacco Use      Smoking status: Never Smoker      Smokeless tobacco: Never Used    Substance and Sexual Activity      Alcohol use: No        Comment: socially      Drug use: No      Sexual activity: Yes        Partners: Male    Lifestyle      Physical act discharge or lesions noted. Bladder: well supported, urethra wnl, no palpable tenderness or masses, no discharge  Vagina: normal pink mucosa, no lesions, normal clear discharge.    Uterus: midline, mobile, non-tender, firm and smooth  Cervix: pink, no les

## 2020-02-05 LAB
C TRACH DNA SPEC QL NAA+PROBE: NEGATIVE
N GONORRHOEA DNA SPEC QL NAA+PROBE: NEGATIVE

## 2020-02-07 LAB
GENITAL VAGINOSIS SCREEN: NEGATIVE
TRICHOMONAS SCREEN: NEGATIVE

## 2020-02-28 ENCOUNTER — OFFICE VISIT (OUTPATIENT)
Dept: FAMILY MEDICINE CLINIC | Facility: CLINIC | Age: 40
End: 2020-02-28
Payer: COMMERCIAL

## 2020-02-28 ENCOUNTER — APPOINTMENT (OUTPATIENT)
Dept: LAB | Facility: REFERENCE LAB | Age: 40
End: 2020-02-28
Attending: FAMILY MEDICINE
Payer: COMMERCIAL

## 2020-02-28 VITALS
TEMPERATURE: 99 F | BODY MASS INDEX: 25.1 KG/M2 | DIASTOLIC BLOOD PRESSURE: 72 MMHG | RESPIRATION RATE: 16 BRPM | HEART RATE: 85 BPM | HEIGHT: 64 IN | SYSTOLIC BLOOD PRESSURE: 110 MMHG | OXYGEN SATURATION: 98 % | WEIGHT: 147 LBS

## 2020-02-28 DIAGNOSIS — E55.9 VITAMIN D DEFICIENCY: ICD-10-CM

## 2020-02-28 DIAGNOSIS — E78.2 MIXED HYPERLIPIDEMIA: ICD-10-CM

## 2020-02-28 DIAGNOSIS — Z11.1 TUBERCULOSIS SCREENING: ICD-10-CM

## 2020-02-28 DIAGNOSIS — J06.9 VIRAL URI: ICD-10-CM

## 2020-02-28 DIAGNOSIS — Z02.89 ENCOUNTER FOR PHYSICAL EXAMINATION RELATED TO EMPLOYMENT: Primary | ICD-10-CM

## 2020-02-28 DIAGNOSIS — G43.009 MIGRAINE WITHOUT AURA AND WITHOUT STATUS MIGRAINOSUS, NOT INTRACTABLE: ICD-10-CM

## 2020-02-28 LAB
ALBUMIN SERPL-MCNC: 3.7 G/DL (ref 3.4–5)
ALBUMIN/GLOB SERPL: 1.1 {RATIO} (ref 1–2)
ALP LIVER SERPL-CCNC: 65 U/L (ref 37–98)
ALT SERPL-CCNC: 17 U/L (ref 13–56)
ANION GAP SERPL CALC-SCNC: 3 MMOL/L (ref 0–18)
AST SERPL-CCNC: 18 U/L (ref 15–37)
BILIRUB SERPL-MCNC: 0.4 MG/DL (ref 0.1–2)
BUN BLD-MCNC: 11 MG/DL (ref 7–18)
BUN/CREAT SERPL: 11.2 (ref 10–20)
CALCIUM BLD-MCNC: 8.7 MG/DL (ref 8.5–10.1)
CHLORIDE SERPL-SCNC: 109 MMOL/L (ref 98–112)
CHOLEST SMN-MCNC: 209 MG/DL (ref ?–200)
CO2 SERPL-SCNC: 30 MMOL/L (ref 21–32)
CREAT BLD-MCNC: 0.98 MG/DL (ref 0.55–1.02)
DEPRECATED RDW RBC AUTO: 45.8 FL (ref 35.1–46.3)
ERYTHROCYTE [DISTWIDTH] IN BLOOD BY AUTOMATED COUNT: 12.8 % (ref 11–15)
GLOBULIN PLAS-MCNC: 3.4 G/DL (ref 2.8–4.4)
GLUCOSE BLD-MCNC: 85 MG/DL (ref 70–99)
HCT VFR BLD AUTO: 41.8 % (ref 35–48)
HDLC SERPL-MCNC: 88 MG/DL (ref 40–59)
HGB BLD-MCNC: 13.7 G/DL (ref 12–16)
LDLC SERPL CALC-MCNC: 112 MG/DL (ref ?–100)
M PROTEIN MFR SERPL ELPH: 7.1 G/DL (ref 6.4–8.2)
MCH RBC QN AUTO: 32.1 PG (ref 26–34)
MCHC RBC AUTO-ENTMCNC: 32.8 G/DL (ref 31–37)
MCV RBC AUTO: 97.9 FL (ref 80–100)
NONHDLC SERPL-MCNC: 121 MG/DL (ref ?–130)
OSMOLALITY SERPL CALC.SUM OF ELEC: 293 MOSM/KG (ref 275–295)
PATIENT FASTING Y/N/NP: YES
PATIENT FASTING Y/N/NP: YES
PLATELET # BLD AUTO: 190 10(3)UL (ref 150–450)
POTASSIUM SERPL-SCNC: 4.2 MMOL/L (ref 3.5–5.1)
RBC # BLD AUTO: 4.27 X10(6)UL (ref 3.8–5.3)
RUBV IGG SER QL: POSITIVE
RUBV IGG SER-ACNC: 95.7 IU/ML (ref 10–?)
SODIUM SERPL-SCNC: 142 MMOL/L (ref 136–145)
TRIGL SERPL-MCNC: 46 MG/DL (ref 30–149)
VLDLC SERPL CALC-MCNC: 9 MG/DL (ref 0–30)
WBC # BLD AUTO: 3.3 X10(3) UL (ref 4–11)

## 2020-02-28 PROCEDURE — 86580 TB INTRADERMAL TEST: CPT | Performed by: FAMILY MEDICINE

## 2020-02-28 PROCEDURE — 86762 RUBELLA ANTIBODY: CPT | Performed by: FAMILY MEDICINE

## 2020-02-28 PROCEDURE — 36415 COLL VENOUS BLD VENIPUNCTURE: CPT | Performed by: FAMILY MEDICINE

## 2020-02-28 PROCEDURE — 80053 COMPREHEN METABOLIC PANEL: CPT | Performed by: FAMILY MEDICINE

## 2020-02-28 PROCEDURE — 86765 RUBEOLA ANTIBODY: CPT | Performed by: FAMILY MEDICINE

## 2020-02-28 PROCEDURE — 85027 COMPLETE CBC AUTOMATED: CPT | Performed by: FAMILY MEDICINE

## 2020-02-28 PROCEDURE — 80061 LIPID PANEL: CPT | Performed by: FAMILY MEDICINE

## 2020-02-28 PROCEDURE — 82306 VITAMIN D 25 HYDROXY: CPT | Performed by: FAMILY MEDICINE

## 2020-02-28 PROCEDURE — 99213 OFFICE O/P EST LOW 20 MIN: CPT | Performed by: FAMILY MEDICINE

## 2020-02-28 PROCEDURE — 86735 MUMPS ANTIBODY: CPT | Performed by: FAMILY MEDICINE

## 2020-02-28 NOTE — PROGRESS NOTES
HPI:    Patient ID: Sherif Sloan is a 44year old female who presents for work physical, URI sx, and labs. 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 Needs work form completed every 2 years. No issues or concerns.    Does not have t SpO2 98%   BMI 25.23 kg/m²     PHYSICAL EXAM:   Physical Exam    Constitutional: She appears well-developed and well-nourished. No distress. HENT:   Head: Normocephalic and atraumatic.    Right Ear: Tympanic membrane, external ear and ear canal normal. read.  -No prior MMR info available. Titers ordered.   -Tdap UTD.  -Pt declined flu vaccine.   -Will complete form once results obtained. 2. Viral URI  -Improving.  -May trial robitussin and/or mucinex prn for cough.  Educated that it may still linger f

## 2020-03-02 LAB
25(OH)D3 SERPL-MCNC: 12.8 NG/ML (ref 30–100)
INDURATION (): 0 MM (ref 0–11)
MEV IGG SER-ACNC: 46.5 AU/ML (ref 16.5–?)
MUV IGG SER IA-ACNC: 170 AU/ML (ref 11–?)

## 2020-03-04 ENCOUNTER — TELEPHONE (OUTPATIENT)
Dept: FAMILY MEDICINE CLINIC | Facility: CLINIC | Age: 40
End: 2020-03-04

## 2020-03-04 DIAGNOSIS — E55.9 VITAMIN D DEFICIENCY: Primary | ICD-10-CM

## 2020-03-05 RX ORDER — PROPRANOLOL HYDROCHLORIDE 20 MG/1
TABLET ORAL
Qty: 60 TABLET | Refills: 0 | Status: SHIPPED | OUTPATIENT
Start: 2020-03-05 | End: 2020-04-01

## 2020-03-18 ENCOUNTER — HOSPITAL ENCOUNTER (OUTPATIENT)
Dept: ULTRASOUND IMAGING | Facility: HOSPITAL | Age: 40
Discharge: HOME | End: 2020-03-18
Attending: OBSTETRICS & GYNECOLOGY
Payer: COMMERCIAL

## 2020-03-18 DIAGNOSIS — R10.2 PELVIC PAIN IN FEMALE: ICD-10-CM

## 2020-03-18 PROCEDURE — 76830 TRANSVAGINAL US NON-OB: CPT | Performed by: OBSTETRICS & GYNECOLOGY

## 2020-03-18 PROCEDURE — 76856 US EXAM PELVIC COMPLETE: CPT | Performed by: OBSTETRICS & GYNECOLOGY

## 2020-04-01 RX ORDER — PROPRANOLOL HYDROCHLORIDE 20 MG/1
TABLET ORAL
Qty: 60 TABLET | Refills: 0 | Status: SHIPPED | OUTPATIENT
Start: 2020-04-01 | End: 2020-08-31

## 2020-04-06 RX ORDER — ALBUTEROL SULFATE 90 UG/1
2 AEROSOL, METERED RESPIRATORY (INHALATION) EVERY 6 HOURS PRN
Qty: 1 INHALER | Refills: 2 | Status: SHIPPED | OUTPATIENT
Start: 2020-04-06 | End: 2020-08-03

## 2020-04-06 NOTE — TELEPHONE ENCOUNTER
A refill request was received for:  Requested Prescriptions     Pending Prescriptions Disp Refills   • Albuterol Sulfate  (90 Base) MCG/ACT Inhalation Aero Soln 1 Inhaler 1     Sig: Inhale 2 puffs into the lungs every 6 (six) hours as needed for [facility]

## 2020-04-08 ENCOUNTER — TELEMEDICINE (OUTPATIENT)
Dept: FAMILY MEDICINE CLINIC | Facility: CLINIC | Age: 40
End: 2020-04-08
Payer: COMMERCIAL

## 2020-04-08 DIAGNOSIS — B37.3 VAGINAL CANDIDIASIS: ICD-10-CM

## 2020-04-08 DIAGNOSIS — U07.1 INFECTION DUE TO 2019 NOVEL CORONAVIRUS: Primary | ICD-10-CM

## 2020-04-08 PROCEDURE — 99213 OFFICE O/P EST LOW 20 MIN: CPT | Performed by: FAMILY MEDICINE

## 2020-04-08 RX ORDER — FLUCONAZOLE 150 MG/1
TABLET ORAL
Qty: 2 TABLET | Refills: 0 | Status: SHIPPED | OUTPATIENT
Start: 2020-04-08 | End: 2020-05-15

## 2020-04-08 RX ORDER — AMOXICILLIN 500 MG/1
500 CAPSULE ORAL EVERY 8 HOURS
COMMUNITY
Start: 2020-04-01 | End: 2020-04-08 | Stop reason: ALTCHOICE

## 2020-04-08 NOTE — PROGRESS NOTES
CC:  Patient presents with: Follow - Up: +covid-19      HPI: 44year old female here for video visit to follow-up on Covid 19 infection. She reports she started feeling off at the end of February and was diagnosed with a viral URI.   Went to an urgent car History      Occupation:       Occupation:     Social Needs      Financial resource strain: Not on file      Food insecurity:        Worry: Not on file        Inability: Not on file      Transportation needs:        Medical: Not Base) MCG/ACT Inhalation Aero Soln Inhale 2 puffs into the lungs every 6 (six) hours as needed for Wheezing or Shortness of Breath.  1 Inhaler 2   • PROPRANOLOL HCL 20 MG Oral Tab TAKE 1 TABLET BY MOUTH TWICE A DAY 60 tablet 0   • cholecalciferol 1.25 MG (5

## 2020-04-08 NOTE — PATIENT INSTRUCTIONS
Coronavirus Disease 2019 (COVID-19)     [place] [address] is committed to the safety and well-being of our patients, members, employees, and communities.  As concerns arise about the new strain of coronavirus that causes COVID-19, [place] [address] 9. Avoid sharing personal items with other people in your household, like dishes, towels, and bedding   10. Clean all surfaces that are touched often, like counters, tabletops, and doorknobs.  Use household cleaning sprays or wipes according to the label in Please call your primary care provider within 2 days of your discharge to arrange for a telehealth follow-up.   Additional Information      You can also get more information at the following websites:   Centers for Disease Control & Prevention (CDC)  What t

## 2020-05-15 ENCOUNTER — TELEPHONE (OUTPATIENT)
Dept: OBGYN CLINIC | Facility: CLINIC | Age: 40
End: 2020-05-15

## 2020-05-15 RX ORDER — FLUCONAZOLE 150 MG/1
TABLET ORAL
Qty: 2 TABLET | Refills: 0 | Status: SHIPPED | OUTPATIENT
Start: 2020-05-15 | End: 2020-08-26

## 2020-05-15 NOTE — TELEPHONE ENCOUNTER
Can try Fluconazole once more x 2 doses, but if does not clear up or returns, needs office visit with myself or gyne (she sees Dr. Mary Kate Roman). Ensure pharmacy is correct.

## 2020-05-15 NOTE — TELEPHONE ENCOUNTER
AS' msg relayed to pt. Med was sent to correct pharmacy. Pt verbalized understanding and agrees with POC.

## 2020-05-15 NOTE — TELEPHONE ENCOUNTER
Pt states her sx from last yeast infection cleared up but now the thick white, cottage cheese like vag discharge is back for \"a couple of days\". Advised pt to start daily probiotic and to be discussed with AS since she Tx pt approx 5 weeks ago.  Pt denies

## 2020-05-20 ENCOUNTER — OFFICE VISIT (OUTPATIENT)
Dept: OBGYN CLINIC | Facility: CLINIC | Age: 40
End: 2020-05-20
Payer: COMMERCIAL

## 2020-05-20 VITALS
HEIGHT: 64 IN | BODY MASS INDEX: 25.42 KG/M2 | DIASTOLIC BLOOD PRESSURE: 80 MMHG | WEIGHT: 148.88 LBS | SYSTOLIC BLOOD PRESSURE: 118 MMHG

## 2020-05-20 DIAGNOSIS — Z12.4 SCREENING FOR MALIGNANT NEOPLASM OF THE CERVIX: ICD-10-CM

## 2020-05-20 DIAGNOSIS — N76.0 VAGINITIS AND VULVOVAGINITIS: Primary | ICD-10-CM

## 2020-05-20 DIAGNOSIS — Z11.3 SCREENING EXAMINATION FOR VENEREAL DISEASE: ICD-10-CM

## 2020-05-20 PROCEDURE — 99213 OFFICE O/P EST LOW 20 MIN: CPT | Performed by: OBSTETRICS & GYNECOLOGY

## 2020-05-20 PROCEDURE — 87205 SMEAR GRAM STAIN: CPT | Performed by: OBSTETRICS & GYNECOLOGY

## 2020-05-20 PROCEDURE — 3074F SYST BP LT 130 MM HG: CPT | Performed by: OBSTETRICS & GYNECOLOGY

## 2020-05-20 PROCEDURE — 3008F BODY MASS INDEX DOCD: CPT | Performed by: OBSTETRICS & GYNECOLOGY

## 2020-05-20 PROCEDURE — 87106 FUNGI IDENTIFICATION YEAST: CPT | Performed by: OBSTETRICS & GYNECOLOGY

## 2020-05-20 PROCEDURE — 87591 N.GONORRHOEAE DNA AMP PROB: CPT | Performed by: OBSTETRICS & GYNECOLOGY

## 2020-05-20 PROCEDURE — 87808 TRICHOMONAS ASSAY W/OPTIC: CPT | Performed by: OBSTETRICS & GYNECOLOGY

## 2020-05-20 PROCEDURE — 87491 CHLMYD TRACH DNA AMP PROBE: CPT | Performed by: OBSTETRICS & GYNECOLOGY

## 2020-05-20 PROCEDURE — 87624 HPV HI-RISK TYP POOLED RSLT: CPT | Performed by: OBSTETRICS & GYNECOLOGY

## 2020-05-20 PROCEDURE — 3079F DIAST BP 80-89 MM HG: CPT | Performed by: OBSTETRICS & GYNECOLOGY

## 2020-05-20 RX ORDER — CLINDAMYCIN HYDROCHLORIDE 300 MG/1
300 CAPSULE ORAL 3 TIMES DAILY
Qty: 9 CAPSULE | Refills: 0 | Status: SHIPPED | OUTPATIENT
Start: 2020-05-20 | End: 2020-05-23

## 2020-05-20 NOTE — PROGRESS NOTES
Darrel Overton here for a checkup. She is complaining of vaginal discharge with \"fishy odor\" patient has history of bacterial vaginosis in the past.  She denies any fever, chills, lower abdominal pain.     On examination: Her abdominal exam shows abdomen is s performed. Small hemorrhoids. ASSESSMENT/PLAN:   Assessment     No diagnosis found. ASSESSMENT:      Clinically bacterial vaginosis    PLAN:     Patient says clindamycin works best for her with metronidazole she gets severely nauseated.

## 2020-05-26 RX ORDER — CHOLECALCIFEROL (VITAMIN D3) 1250 MCG
CAPSULE ORAL
Qty: 12 CAPSULE | Refills: 0 | OUTPATIENT
Start: 2020-05-26

## 2020-05-26 NOTE — TELEPHONE ENCOUNTER
Please have patient come in for vitamin d blood draw as previously ordered to ensure her level is high enough prior to any refills.

## 2020-05-26 NOTE — TELEPHONE ENCOUNTER
A refill request was received for:  Requested Prescriptions     Pending Prescriptions Disp Refills   • VITAMIN D3 1.25 MG (20451 UT) Oral Cap [Pharmacy Med Name: VITAMIN D3 50,000 UNIT CAPSULE] 12 capsule 0     Sig: TAKE 1 CAPSULE (1.25 MG TOTAL) BY MOUTH

## 2020-06-04 ENCOUNTER — PATIENT MESSAGE (OUTPATIENT)
Dept: OBGYN CLINIC | Facility: CLINIC | Age: 40
End: 2020-06-04

## 2020-06-04 RX ORDER — CLINDAMYCIN HYDROCHLORIDE 300 MG/1
300 CAPSULE ORAL 3 TIMES DAILY
Qty: 9 CAPSULE | Refills: 0 | Status: SHIPPED | OUTPATIENT
Start: 2020-06-04 | End: 2020-06-07

## 2020-06-04 NOTE — TELEPHONE ENCOUNTER
Last visit 05/20/20 [facility]    Paged Dr. Phu Mcneal and informed of mychart message and okay to provide pt with Clindamycin refill.     Dustin Jane,    Thank you for reaching out to us.  I saw your my chart message and spoke with Dr. Yokasta crabtree has b

## 2020-06-22 RX ORDER — VALACYCLOVIR HYDROCHLORIDE 1 G/1
1 TABLET, FILM COATED ORAL DAILY
Qty: 90 TABLET | Refills: 2 | Status: SHIPPED | OUTPATIENT
Start: 2020-06-22 | End: 2021-10-04

## 2020-06-23 ENCOUNTER — PATIENT MESSAGE (OUTPATIENT)
Dept: OBGYN CLINIC | Facility: CLINIC | Age: 40
End: 2020-06-23

## 2020-06-23 NOTE — TELEPHONE ENCOUNTER
Jarrod Pool,    Thank you for reaching out to us.  I spoke with Dr. Devan Dixon and he stated that depending on the situation is how antibiotic therapy is prescribed.  Any concerns or additional questions, please call us at [phone].     Thanks,    Anit

## 2020-06-29 ENCOUNTER — OFFICE VISIT (OUTPATIENT)
Dept: OBGYN CLINIC | Facility: CLINIC | Age: 40
End: 2020-06-29
Payer: COMMERCIAL

## 2020-06-29 VITALS
WEIGHT: 148 LBS | SYSTOLIC BLOOD PRESSURE: 118 MMHG | DIASTOLIC BLOOD PRESSURE: 80 MMHG | BODY MASS INDEX: 25.27 KG/M2 | HEIGHT: 64 IN

## 2020-06-29 DIAGNOSIS — N76.0 VAGINITIS AND VULVOVAGINITIS: Primary | ICD-10-CM

## 2020-06-29 DIAGNOSIS — Z12.31 BREAST CANCER SCREENING BY MAMMOGRAM: ICD-10-CM

## 2020-06-29 PROCEDURE — 87106 FUNGI IDENTIFICATION YEAST: CPT | Performed by: OBSTETRICS & GYNECOLOGY

## 2020-06-29 PROCEDURE — 99213 OFFICE O/P EST LOW 20 MIN: CPT | Performed by: OBSTETRICS & GYNECOLOGY

## 2020-06-29 PROCEDURE — 87808 TRICHOMONAS ASSAY W/OPTIC: CPT | Performed by: OBSTETRICS & GYNECOLOGY

## 2020-06-29 PROCEDURE — 87205 SMEAR GRAM STAIN: CPT | Performed by: OBSTETRICS & GYNECOLOGY

## 2020-06-29 RX ORDER — METRONIDAZOLE 7.5 MG/G
1 GEL VAGINAL NIGHTLY
Qty: 1 TUBE | Refills: 0 | Status: SHIPPED | OUTPATIENT
Start: 2020-06-29 | End: 2020-07-04

## 2020-06-29 NOTE — PROGRESS NOTES
CC: Patient is here for recurrent BV. HPI: Patient is a 44year old  for recurrent BV - 3rd time in 2 M. She has tried oral clindamycin 3 x without relief.  She has a h/o nausea and vomiting with oral flagyl in the past.     Her symptoms are a fi file      Food insecurity:        Worry: Not on file        Inability: Not on file      Transportation needs:        Medical: Not on file        Non-medical: Not on file    Tobacco Use      Smoking status: Never Smoker      Smokeless tobacco: Never Used spleen non-tender, no enlargement. No palpable hernias  GYNE/:  External Genitalia: Normal appearing, no lesions, normal hair distribution   Urethral meatus appear wnl, no abnormal discharge or lesions noted.    Bladder: well supported, urethra wnl, no pa

## 2020-07-01 LAB
GENITAL VAGINOSIS SCREEN: POSITIVE
TRICHOMONAS SCREEN: NEGATIVE

## 2020-07-18 ENCOUNTER — HOSPITAL ENCOUNTER (OUTPATIENT)
Dept: MAMMOGRAPHY | Age: 40
Discharge: HOME | End: 2020-07-18
Attending: OBSTETRICS & GYNECOLOGY
Payer: COMMERCIAL

## 2020-07-18 DIAGNOSIS — Z12.31 BREAST CANCER SCREENING BY MAMMOGRAM: ICD-10-CM

## 2020-08-05 RX ORDER — ALBUTEROL SULFATE 90 UG/1
2 AEROSOL, METERED RESPIRATORY (INHALATION) EVERY 6 HOURS PRN
Qty: 1 INHALER | Refills: 1 | Status: SHIPPED | OUTPATIENT
Start: 2020-08-05 | End: 2021-01-04

## 2020-08-26 RX ORDER — FLUCONAZOLE 150 MG/1
TABLET ORAL
Qty: 2 TABLET | Refills: 0 | Status: SHIPPED | OUTPATIENT
Start: 2020-08-26 | End: 2021-02-09

## 2020-08-26 NOTE — TELEPHONE ENCOUNTER
A refill request was received for:  Requested Prescriptions     Pending Prescriptions Disp Refills   • fluconazole 150 MG Oral Tab [Pharmacy Med Name: FLUCONAZOLE 150MG TABLETS] 2 tablet 0     Sig: TAKE 1 TABLET BY MOUTH NOW AND THE SECOND ONE IN 72 HOURS

## 2020-08-31 ENCOUNTER — OFFICE VISIT (OUTPATIENT)
Dept: OBGYN CLINIC | Facility: CLINIC | Age: 40
End: 2020-08-31
Payer: COMMERCIAL

## 2020-08-31 VITALS
BODY MASS INDEX: 24.59 KG/M2 | HEIGHT: 64 IN | DIASTOLIC BLOOD PRESSURE: 80 MMHG | WEIGHT: 144 LBS | SYSTOLIC BLOOD PRESSURE: 120 MMHG

## 2020-08-31 DIAGNOSIS — N76.0 VAGINITIS AND VULVOVAGINITIS: Primary | ICD-10-CM

## 2020-08-31 PROCEDURE — 87205 SMEAR GRAM STAIN: CPT | Performed by: OBSTETRICS & GYNECOLOGY

## 2020-08-31 PROCEDURE — 3079F DIAST BP 80-89 MM HG: CPT | Performed by: OBSTETRICS & GYNECOLOGY

## 2020-08-31 PROCEDURE — 3008F BODY MASS INDEX DOCD: CPT | Performed by: OBSTETRICS & GYNECOLOGY

## 2020-08-31 PROCEDURE — 99213 OFFICE O/P EST LOW 20 MIN: CPT | Performed by: OBSTETRICS & GYNECOLOGY

## 2020-08-31 PROCEDURE — 3074F SYST BP LT 130 MM HG: CPT | Performed by: OBSTETRICS & GYNECOLOGY

## 2020-08-31 PROCEDURE — 87808 TRICHOMONAS ASSAY W/OPTIC: CPT | Performed by: OBSTETRICS & GYNECOLOGY

## 2020-08-31 PROCEDURE — 87106 FUNGI IDENTIFICATION YEAST: CPT | Performed by: OBSTETRICS & GYNECOLOGY

## 2020-08-31 RX ORDER — CLINDAMYCIN HYDROCHLORIDE 300 MG/1
300 CAPSULE ORAL 2 TIMES DAILY
Qty: 14 CAPSULE | Refills: 2 | Status: SHIPPED | OUTPATIENT
Start: 2020-08-31 | End: 2021-02-10

## 2020-08-31 RX ORDER — CLINDAMYCIN PHOSPHATE 20 MG/G
1 CREAM VAGINAL NIGHTLY
Qty: 40 G | Refills: 2 | Status: SHIPPED | OUTPATIENT
Start: 2020-08-31 | End: 2021-03-11

## 2020-08-31 NOTE — PROGRESS NOTES
CC: Patient is here for recurrent BV    HPI: Patient is a 36year old  for recurrent BV. She has a fishy vaginal odor and some discharge. Patient with a h/o recurrent BV. She used metrogel at the end of .  She has had recurrent episodes and h Worry: Not on file        Inability: Not on file      Transportation needs:        Medical: Not on file        Non-medical: Not on file    Tobacco Use      Smoking status: Never Smoker      Smokeless tobacco: Never Used    Substance and Sexual Activity palpable hernias  GYNE/:  External Genitalia: Normal appearing, no lesions, normal hair distribution   Urethral meatus appear wnl, no abnormal discharge or lesions noted.    Bladder: well supported, urethra wnl, no palpable tenderness or masses, no discha

## 2020-09-02 LAB
GENITAL VAGINOSIS SCREEN: NEGATIVE
TRICHOMONAS SCREEN: NEGATIVE

## 2020-09-02 RX ORDER — FLUCONAZOLE 150 MG/1
150 TABLET ORAL ONCE
Qty: 1 TABLET | Refills: 0 | Status: SHIPPED | OUTPATIENT
Start: 2020-09-02 | End: 2020-09-02

## 2020-11-17 ENCOUNTER — TELEPHONE (OUTPATIENT)
Dept: OBGYN CLINIC | Facility: CLINIC | Age: 40
End: 2020-11-17

## 2020-11-17 NOTE — TELEPHONE ENCOUNTER
MD Ruby Sibley, RASHIDA; Amanda Hall, RN             Can someone call this pt and remind her to get her mammogram?      LVM to call Central Scheduling  [phone] d/t mammo over due.

## 2021-01-05 RX ORDER — ALBUTEROL SULFATE 90 UG/1
2 AEROSOL, METERED RESPIRATORY (INHALATION) EVERY 6 HOURS PRN
Qty: 1 INHALER | Refills: 1 | Status: SHIPPED | OUTPATIENT
Start: 2021-01-05

## 2021-01-05 NOTE — TELEPHONE ENCOUNTER
A refill request was received for:  Requested Prescriptions     Pending Prescriptions Disp Refills   • Albuterol Sulfate  (90 Base) MCG/ACT Inhalation Aero Soln 1 Inhaler 1     Sig: Inhale 2 puffs into the lungs every 6 (six) hours as needed for [facility]

## 2021-01-29 RX ORDER — FLUCONAZOLE 150 MG/1
TABLET ORAL
Qty: 2 TABLET | Refills: 0 | OUTPATIENT
Start: 2021-01-29

## 2021-02-09 ENCOUNTER — OFFICE VISIT (OUTPATIENT)
Dept: OBGYN CLINIC | Facility: CLINIC | Age: 41
End: 2021-02-09
Payer: COMMERCIAL

## 2021-02-09 VITALS
BODY MASS INDEX: 24.41 KG/M2 | DIASTOLIC BLOOD PRESSURE: 78 MMHG | SYSTOLIC BLOOD PRESSURE: 100 MMHG | HEIGHT: 64 IN | WEIGHT: 143 LBS

## 2021-02-09 DIAGNOSIS — N76.0 VAGINITIS AND VULVOVAGINITIS: Primary | ICD-10-CM

## 2021-02-09 PROCEDURE — 87106 FUNGI IDENTIFICATION YEAST: CPT | Performed by: OBSTETRICS & GYNECOLOGY

## 2021-02-09 PROCEDURE — 3078F DIAST BP <80 MM HG: CPT | Performed by: OBSTETRICS & GYNECOLOGY

## 2021-02-09 PROCEDURE — 3074F SYST BP LT 130 MM HG: CPT | Performed by: OBSTETRICS & GYNECOLOGY

## 2021-02-09 PROCEDURE — 87808 TRICHOMONAS ASSAY W/OPTIC: CPT | Performed by: OBSTETRICS & GYNECOLOGY

## 2021-02-09 PROCEDURE — 87205 SMEAR GRAM STAIN: CPT | Performed by: OBSTETRICS & GYNECOLOGY

## 2021-02-09 PROCEDURE — 99213 OFFICE O/P EST LOW 20 MIN: CPT | Performed by: OBSTETRICS & GYNECOLOGY

## 2021-02-09 PROCEDURE — 3008F BODY MASS INDEX DOCD: CPT | Performed by: OBSTETRICS & GYNECOLOGY

## 2021-02-09 RX ORDER — PHENTERMINE HYDROCHLORIDE 37.5 MG/1
37.5 TABLET ORAL DAILY
COMMUNITY
Start: 2020-12-05

## 2021-02-09 RX ORDER — FLUCONAZOLE 150 MG/1
TABLET ORAL
Qty: 2 TABLET | Refills: 0 | Status: SHIPPED | OUTPATIENT
Start: 2021-02-09 | End: 2021-03-11

## 2021-02-09 NOTE — PROGRESS NOTES
CC: Patient is here for vvaginal discharge. HPI: Patient is a 36year old  for possible yeast infection, with external itch and thicken discharge. No recent ABX. No new sex partner. No OTC treatment.  has had a vasectomy.      Patient' Non-medical: Not on file    Tobacco Use      Smoking status: Never Smoker      Smokeless tobacco: Never Used    Substance and Sexual Activity      Alcohol use: No        Comment: socially      Drug use: No      Sexual activity: Yes        Partners: Male meatus appear wnl, no abnormal discharge or lesions noted. Bladder: well supported, urethra wnl, no palpable tenderness or masses, no discharge  Vagina: normal pink mucosa, no lesions, normal clear discharge.    Uterus: midline, mobile, non-tender, firm a

## 2021-02-10 RX ORDER — CLINDAMYCIN HYDROCHLORIDE 300 MG/1
300 CAPSULE ORAL 2 TIMES DAILY
Qty: 14 CAPSULE | Refills: 0 | Status: SHIPPED | OUTPATIENT
Start: 2021-02-10 | End: 2021-03-11

## 2021-02-11 LAB
GENITAL VAGINOSIS SCREEN: POSITIVE
TRICHOMONAS SCREEN: NEGATIVE

## 2021-03-11 ENCOUNTER — OFFICE VISIT (OUTPATIENT)
Dept: OBGYN CLINIC | Facility: CLINIC | Age: 41
End: 2021-03-11
Payer: COMMERCIAL

## 2021-03-11 VITALS
HEIGHT: 64 IN | WEIGHT: 145 LBS | BODY MASS INDEX: 24.75 KG/M2 | SYSTOLIC BLOOD PRESSURE: 118 MMHG | DIASTOLIC BLOOD PRESSURE: 70 MMHG

## 2021-03-11 DIAGNOSIS — N94.89 MENSTRUAL SUPPRESSION: Primary | ICD-10-CM

## 2021-03-11 PROCEDURE — 3074F SYST BP LT 130 MM HG: CPT | Performed by: OBSTETRICS & GYNECOLOGY

## 2021-03-11 PROCEDURE — 3008F BODY MASS INDEX DOCD: CPT | Performed by: OBSTETRICS & GYNECOLOGY

## 2021-03-11 PROCEDURE — 3078F DIAST BP <80 MM HG: CPT | Performed by: OBSTETRICS & GYNECOLOGY

## 2021-03-11 PROCEDURE — 99213 OFFICE O/P EST LOW 20 MIN: CPT | Performed by: OBSTETRICS & GYNECOLOGY

## 2021-03-11 RX ORDER — NORETHINDRONE ACETATE AND ETHINYL ESTRADIOL 1MG-20(21)
1 KIT ORAL DAILY
Qty: 90 TABLET | Refills: 3 | Status: SHIPPED | OUTPATIENT
Start: 2021-03-11 | End: 2021-08-18

## 2021-03-11 NOTE — PROGRESS NOTES
Chandra Severance is a 36year old female. HPI:   Patient presents with:  Menstrual Problem: would like to discuss options for stopping her menstrual period    Acute visit - would like to discuss suppression of menses.  Due for annual gyne exam - last PAP 4

## 2021-05-12 ENCOUNTER — TELEPHONE (OUTPATIENT)
Dept: OBGYN CLINIC | Facility: CLINIC | Age: 41
End: 2021-05-12

## 2021-06-07 ENCOUNTER — TELEPHONE (OUTPATIENT)
Dept: OBGYN CLINIC | Facility: CLINIC | Age: 41
End: 2021-06-07

## 2021-06-07 RX ORDER — FLUCONAZOLE 150 MG/1
TABLET ORAL
Qty: 2 TABLET | Refills: 0 | Status: SHIPPED | OUTPATIENT
Start: 2021-06-07 | End: 2021-08-11

## 2021-06-07 NOTE — TELEPHONE ENCOUNTER
Pt states she has been having itching and burning sensation which started a couple of days. Direct touch seems to irritate more. Also has clumpy discharge.

## 2021-06-07 NOTE — TELEPHONE ENCOUNTER
RN contacted pt, pt reports symptoms of a yeast infection burning, itching, white discharge). Pt had an appt today but had to reschedule d/t running late. RN spoke to EB who is okay ordering Diflucan. RN notified pt.  Pt denies any other concerns, states

## 2021-08-11 RX ORDER — FLUCONAZOLE 150 MG/1
TABLET ORAL
Qty: 2 TABLET | Refills: 0 | Status: SHIPPED | OUTPATIENT
Start: 2021-08-11 | End: 2021-12-06

## 2021-08-18 ENCOUNTER — OFFICE VISIT (OUTPATIENT)
Dept: OBGYN CLINIC | Facility: CLINIC | Age: 41
End: 2021-08-18
Payer: COMMERCIAL

## 2021-08-18 VITALS
SYSTOLIC BLOOD PRESSURE: 118 MMHG | DIASTOLIC BLOOD PRESSURE: 66 MMHG | BODY MASS INDEX: 25.78 KG/M2 | WEIGHT: 151 LBS | HEIGHT: 64 IN

## 2021-08-18 DIAGNOSIS — Z12.31 BREAST CANCER SCREENING BY MAMMOGRAM: ICD-10-CM

## 2021-08-18 DIAGNOSIS — Z01.419 WOMEN'S ANNUAL ROUTINE GYNECOLOGICAL EXAMINATION: Primary | ICD-10-CM

## 2021-08-18 PROBLEM — N94.89 MENSTRUAL SUPPRESSION: Status: RESOLVED | Noted: 2021-03-11 | Resolved: 2021-08-18

## 2021-08-18 PROCEDURE — 99396 PREV VISIT EST AGE 40-64: CPT | Performed by: OBSTETRICS & GYNECOLOGY

## 2021-08-18 PROCEDURE — 3008F BODY MASS INDEX DOCD: CPT | Performed by: OBSTETRICS & GYNECOLOGY

## 2021-08-18 PROCEDURE — 3078F DIAST BP <80 MM HG: CPT | Performed by: OBSTETRICS & GYNECOLOGY

## 2021-08-18 PROCEDURE — 3074F SYST BP LT 130 MM HG: CPT | Performed by: OBSTETRICS & GYNECOLOGY

## 2021-08-18 NOTE — PROGRESS NOTES
GYN ANNUAL    2021  2:06 PM    Patient presents with: Annual  .    HPI: Patient is a 39year old  LMP 21 presents for annual gyn exam. Cycles off pills are monthly but somewhat irregular. Reports no gynecologic issues or complaints.  No pe Hypertension Father    • Hypertension Mother    • Diabetes Maternal Grandfather    • Diabetes Maternal Uncle    • Breast Cancer Neg    • Ovarian Cancer Neg    • Colon Cancer Neg      Social History    Socioeconomic History      Marital status:  edema      A/P: Patient is 39year old female here for well-woman exam.     1. Women's annual routine gynecological examination  - Normal    2.  Breast cancer screening by mammogram  - Mammogram ordered          8/18/2021  Jose Francisco MD

## 2021-08-23 ENCOUNTER — TELEMEDICINE (OUTPATIENT)
Dept: INTEGRATIVE MEDICINE | Facility: CLINIC | Age: 41
End: 2021-08-23

## 2021-08-23 DIAGNOSIS — R10.13 EPIGASTRIC PAIN: ICD-10-CM

## 2021-08-23 DIAGNOSIS — R19.8 ALTERNATING CONSTIPATION AND DIARRHEA: ICD-10-CM

## 2021-08-23 DIAGNOSIS — Z86.19 HISTORY OF HELICOBACTER PYLORI INFECTION: ICD-10-CM

## 2021-08-23 DIAGNOSIS — R14.0 BLOATING: ICD-10-CM

## 2021-08-23 DIAGNOSIS — B37.9 CANDIDIASIS: Primary | ICD-10-CM

## 2021-08-23 PROBLEM — A04.8 HELICOBACTER PYLORI INFECTION: Status: ACTIVE | Noted: 2021-07-09

## 2021-08-23 PROBLEM — J45.30 MILD PERSISTENT ASTHMA WITHOUT COMPLICATION: Status: ACTIVE | Noted: 2017-11-28

## 2021-08-23 PROBLEM — J45.30 MILD PERSISTENT ASTHMA WITHOUT COMPLICATION (HCC): Status: ACTIVE | Noted: 2017-11-28

## 2021-08-23 PROCEDURE — 99214 OFFICE O/P EST MOD 30 MIN: CPT | Performed by: FAMILY MEDICINE

## 2021-08-23 RX ORDER — FLUNISOLIDE 0.25 MG/ML
1 SOLUTION NASAL DAILY
COMMUNITY
Start: 2021-07-14

## 2021-08-23 RX ORDER — FLUTICASONE PROPIONATE 44 MCG
1 AEROSOL WITH ADAPTER (GRAM) INHALATION 2 TIMES DAILY
COMMUNITY
Start: 2021-07-09

## 2021-08-23 RX ORDER — NEBULIZER ACCESSORIES
KIT MISCELLANEOUS
COMMUNITY
Start: 2021-07-19

## 2021-08-23 RX ORDER — CETIRIZINE HYDROCHLORIDE 10 MG/1
10 TABLET ORAL DAILY
COMMUNITY
Start: 2021-07-09

## 2021-08-23 RX ORDER — FLUTICASONE PROPIONATE 50 MCG
1 SPRAY, SUSPENSION (ML) NASAL DAILY
COMMUNITY
Start: 2021-07-09

## 2021-08-23 NOTE — PATIENT INSTRUCTIONS
I have complete esvin in the body's ability to heal and transform. The products and items listed below (the “Products”)  and their claims have not been evaluated by the Food and Drug Administration.  Dietary products are not intended to treat, prevent, m connections between cells, in the intestines. These tight junctions get compromised by daily stress, reduced immune function and poor diet.  They are essential to the proper and healthy functioning of the intestines and immune system in general.     Start t

## 2021-08-23 NOTE — PROGRESS NOTES
Sid Miner is a 39year old female. Patient presents with: Follow - Up      HPI:     Bloating and digestion continue to get worse. Bloating is every day. Will happen after she eats every time.    BM - will have 2-3 in a day then will get constipate Nasal route daily. • Respiratory Therapy Supplies (NEBULIZER/TUBING/MOUTHPIECE) Does not apply Kit Use as directed     • cetirizine 10 MG Oral Tab Take 10 mg by mouth daily.      • Fluticasone Propionate 50 MCG/ACT Nasal Suspension 1 spray by Nasal rout Hobby Hazards: Not Asked        Sleep Concern: Not Asked        Back Care: Not Asked        Bike Helmet: Not Asked        Self-Exams: Not Asked    Social History Narrative      Lives with partner and daughter      Son away at school      Works 9pm-5am f results within 6 Month(s) from this visit.    Latest known visit with results is:   Office Visit on 02/09/2021   Component Date Value Ref Range Status   • Genital vaginosis screen 02/09/2021 Positive* Negative Final   • Trichomonas screen 02/09/2021 Negativ and its [facility]) are not liable for the patients use of the Products.  [address] [place] [address] makes no representations or warranties of any kind, expressed or implied, as to the Products, including, but not limited to its efficacy, benefits or outco

## 2021-08-26 ENCOUNTER — LAB ENCOUNTER (OUTPATIENT)
Dept: LAB | Facility: HOSPITAL | Age: 41
End: 2021-08-26
Attending: FAMILY MEDICINE
Payer: COMMERCIAL

## 2021-08-26 DIAGNOSIS — R19.8 ALTERNATING CONSTIPATION AND DIARRHEA: ICD-10-CM

## 2021-08-26 DIAGNOSIS — R14.0 BLOATING: ICD-10-CM

## 2021-08-26 DIAGNOSIS — B37.9 CANDIDIASIS: ICD-10-CM

## 2021-08-26 DIAGNOSIS — Z86.19 HISTORY OF HELICOBACTER PYLORI INFECTION: ICD-10-CM

## 2021-08-26 DIAGNOSIS — R10.13 EPIGASTRIC PAIN: ICD-10-CM

## 2021-08-26 PROCEDURE — 86628 CANDIDA ANTIBODY: CPT

## 2021-08-26 PROCEDURE — 36415 COLL VENOUS BLD VENIPUNCTURE: CPT

## 2021-08-26 PROCEDURE — 83013 H PYLORI (C-13) BREATH: CPT

## 2021-08-27 LAB — H. PYLORI BREATH TEST: NEGATIVE

## 2021-08-31 LAB
CANDIDA ANTIBODY IGA: 1.44 EV
CANDIDA ANTIBODY IGG: 1.82 EV
CANDIDA ANTIBODY IGM: 0.51 EV

## 2021-10-04 RX ORDER — VALACYCLOVIR HYDROCHLORIDE 1 G/1
TABLET, FILM COATED ORAL
Qty: 90 TABLET | Refills: 2 | Status: SHIPPED | OUTPATIENT
Start: 2021-10-04

## 2021-12-06 ENCOUNTER — OFFICE VISIT (OUTPATIENT)
Dept: FAMILY MEDICINE CLINIC | Facility: CLINIC | Age: 41
End: 2021-12-06
Payer: COMMERCIAL

## 2021-12-06 VITALS
HEIGHT: 64 IN | DIASTOLIC BLOOD PRESSURE: 74 MMHG | WEIGHT: 165 LBS | BODY MASS INDEX: 28.17 KG/M2 | SYSTOLIC BLOOD PRESSURE: 116 MMHG

## 2021-12-06 DIAGNOSIS — J06.9 VIRAL URI: ICD-10-CM

## 2021-12-06 DIAGNOSIS — E78.5 DYSLIPIDEMIA: ICD-10-CM

## 2021-12-06 DIAGNOSIS — Z13.1 DIABETES MELLITUS SCREENING: ICD-10-CM

## 2021-12-06 DIAGNOSIS — E55.9 VITAMIN D DEFICIENCY: ICD-10-CM

## 2021-12-06 DIAGNOSIS — Z11.59 ENCOUNTER FOR HEPATITIS C SCREENING TEST FOR LOW RISK PATIENT: ICD-10-CM

## 2021-12-06 DIAGNOSIS — Z00.00 PHYSICAL EXAM, ANNUAL: Primary | ICD-10-CM

## 2021-12-06 PROCEDURE — 99213 OFFICE O/P EST LOW 20 MIN: CPT | Performed by: FAMILY MEDICINE

## 2021-12-06 PROCEDURE — 3074F SYST BP LT 130 MM HG: CPT | Performed by: FAMILY MEDICINE

## 2021-12-06 PROCEDURE — 3008F BODY MASS INDEX DOCD: CPT | Performed by: FAMILY MEDICINE

## 2021-12-06 PROCEDURE — 3078F DIAST BP <80 MM HG: CPT | Performed by: FAMILY MEDICINE

## 2021-12-06 PROCEDURE — 99396 PREV VISIT EST AGE 40-64: CPT | Performed by: FAMILY MEDICINE

## 2021-12-06 NOTE — PROGRESS NOTES
HPI:   Tae Byrne is a 39year old female who presents for a complete physical exam.     Has headaches, body aches, fatigue, raspy voice, runny nose, congestion. Sx started about 1 week ago.  tested positive for strep. Denies strep throat.  Sergio Rodriguez or Shortness of Breath. 1 Inhaler 1   • BORIC ACID 600MG V-CAP Insert 1 capsule VAGINALLY nightly for 7-14 nights then 2-3 times weekly as needed. 30 capsule 0   • Phentermine HCl 37.5 MG Oral Tab Take 37.5 mg by mouth daily.        No Known Allergies   Pas intact and symmetric    Cholesterol, Total (mg/dL)   Date Value   02/28/2020 209 (H)   04/02/2019 206 (H)   03/07/2018 194     HDL Cholesterol (mg/dL)   Date Value   02/28/2020 88 (H)   04/02/2019 71 (H)   03/07/2018 55     LDL Cholesterol (mg/dL)   Date V year for next WWE or sooner as needed.     Meds & Refills for this Visit:  Requested Prescriptions      No prescriptions requested or ordered in this encounter       Imaging & Consults:  None    Page Lux DO  12/6/2021  12:18 PM

## 2021-12-07 RX ORDER — FLUCONAZOLE 150 MG/1
TABLET ORAL
Qty: 2 TABLET | Refills: 0 | OUTPATIENT
Start: 2021-12-07

## 2021-12-07 NOTE — TELEPHONE ENCOUNTER
Called patient to follow up on symptoms for medication request. Patient states she has no symptoms after taking oct meds.

## 2021-12-18 ENCOUNTER — LAB ENCOUNTER (OUTPATIENT)
Dept: LAB | Facility: HOSPITAL | Age: 41
End: 2021-12-18
Attending: FAMILY MEDICINE
Payer: COMMERCIAL

## 2021-12-18 DIAGNOSIS — E55.9 VITAMIN D DEFICIENCY: ICD-10-CM

## 2021-12-18 PROCEDURE — 82306 VITAMIN D 25 HYDROXY: CPT

## 2021-12-18 PROCEDURE — 85027 COMPLETE CBC AUTOMATED: CPT | Performed by: FAMILY MEDICINE

## 2021-12-18 PROCEDURE — 80061 LIPID PANEL: CPT | Performed by: FAMILY MEDICINE

## 2021-12-18 PROCEDURE — 83036 HEMOGLOBIN GLYCOSYLATED A1C: CPT | Performed by: FAMILY MEDICINE

## 2021-12-18 PROCEDURE — 80053 COMPREHEN METABOLIC PANEL: CPT | Performed by: FAMILY MEDICINE

## 2021-12-18 PROCEDURE — 86803 HEPATITIS C AB TEST: CPT | Performed by: FAMILY MEDICINE

## 2022-01-28 ENCOUNTER — TELEPHONE (OUTPATIENT)
Dept: FAMILY MEDICINE CLINIC | Facility: CLINIC | Age: 42
End: 2022-01-28

## 2022-01-28 NOTE — TELEPHONE ENCOUNTER
Patient made appointment online, see details    Appointment For: Eliza Lynn ([id number])   Visit Type: [address] [place] [address] ([id number])      2/3/2022    10:00 AM  30 mins. Oral DO RIC Arcos 10 FP OP      Patient Comments:   Severe chest pain

## 2022-01-28 NOTE — TELEPHONE ENCOUNTER
Called pt, c/o chest pain comes and goes, new type of pain, sharp, pulsing, left side over heart area, unsure length of time pain appears, when pt is sitting watching TV (no physical activity). Does not radiate and cold sweaty.     Advised pt to go to ED

## 2022-06-28 ENCOUNTER — PATIENT MESSAGE (OUTPATIENT)
Dept: FAMILY MEDICINE CLINIC | Facility: CLINIC | Age: 42
End: 2022-06-28

## 2022-06-28 NOTE — TELEPHONE ENCOUNTER
Fabby Mulligan RN 6/28/2022 2:17 PM CDT        ----- Message -----  From: Erum Hayward  Sent: 6/28/2022 12:08 PM CDT  To: Angela Rn Triage  Subject: Leonardo Wray I have been experiencing severe depression,stress grief for the past couple of months and headaches tension in my shoulders and am wondering how do I go about getting fmla paper work completed for my job because I need some time off work.  Thank you in advance

## 2022-06-29 ENCOUNTER — TELEPHONE (OUTPATIENT)
Dept: FAMILY MEDICINE CLINIC | Facility: CLINIC | Age: 42
End: 2022-06-29

## 2022-06-29 NOTE — TELEPHONE ENCOUNTER
Patient called to schedule appointment with Dr. Erika Sweet as directed by nurse in [place] message. Next available appointment with Dr. Erika Sweet is 8/17 but patient would like to see her sooner to have her FMLA start ASAP.

## 2022-06-30 NOTE — TELEPHONE ENCOUNTER
Last read by Nyla Morrison at 5:36 AM on 6/30/2022.     Future Appointments   Date Time Provider [facility] Mary   7/6/2022  3:00 PM Ofe Liz DO [facility] 10 FP [facility] 10 OP   7/8/2022  1:20 PM JOSEK CINTIA RM1 MAURA Bynum

## 2022-07-06 PROBLEM — F32.1 CURRENT MODERATE EPISODE OF MAJOR DEPRESSIVE DISORDER WITHOUT PRIOR EPISODE (HCC): Status: ACTIVE | Noted: 2022-07-06

## 2022-07-08 ENCOUNTER — HOSPITAL ENCOUNTER (OUTPATIENT)
Dept: MAMMOGRAPHY | Age: 42
Discharge: HOME | End: 2022-07-08
Attending: OBSTETRICS & GYNECOLOGY
Payer: COMMERCIAL

## 2022-07-08 DIAGNOSIS — Z12.31 BREAST CANCER SCREENING BY MAMMOGRAM: ICD-10-CM

## 2022-07-08 PROCEDURE — 77067 SCR MAMMO BI INCL CAD: CPT | Performed by: OBSTETRICS & GYNECOLOGY

## 2022-07-08 PROCEDURE — 77063 BREAST TOMOSYNTHESIS BI: CPT | Performed by: OBSTETRICS & GYNECOLOGY

## 2022-07-15 ENCOUNTER — MED REC SCAN ONLY (OUTPATIENT)
Dept: FAMILY MEDICINE CLINIC | Facility: CLINIC | Age: 42
End: 2022-07-15

## 2022-08-09 ENCOUNTER — TELEPHONE (OUTPATIENT)
Dept: FAMILY MEDICINE CLINIC | Facility: CLINIC | Age: 42
End: 2022-08-09

## 2022-08-09 NOTE — TELEPHONE ENCOUNTER
Trini Meier from [facility] in [facility] is calling to state that the prescription that A. S prescribed  fluticasone propionate 50 MCG/ACT Nasal Suspension was not approved by the  insurances and want to know if there are alternate medication that can be prescribed. [facility] [address] [address] [facility] - Sharon Gomez [address] [place] [address].  AT [address], [phone], [phone]

## 2022-09-14 RX ORDER — MOMETASONE FUROATE 50 UG/1
2 SPRAY, METERED NASAL DAILY
Qty: 3 EACH | Refills: 1 | Status: SHIPPED | OUTPATIENT
Start: 2022-09-14

## 2022-09-14 NOTE — TELEPHONE ENCOUNTER
The pharmacy is calling to see if there is another prescription that can be sent because this one is not covered by insurance.     fluticasone propionate 50 MCG/ACT Nasal Suspension

## 2022-10-04 ENCOUNTER — LAB ENCOUNTER (OUTPATIENT)
Dept: LAB | Facility: REFERENCE LAB | Age: 42
End: 2022-10-04
Attending: FAMILY MEDICINE
Payer: COMMERCIAL

## 2022-10-04 DIAGNOSIS — R53.83 FATIGUE, UNSPECIFIED TYPE: ICD-10-CM

## 2022-10-04 DIAGNOSIS — E55.9 VITAMIN D DEFICIENCY: Primary | ICD-10-CM

## 2022-10-04 LAB
BASOPHILS # BLD AUTO: 0.04 X10(3) UL (ref 0–0.2)
BASOPHILS NFR BLD AUTO: 0.7 %
DEPRECATED RDW RBC AUTO: 49.8 FL (ref 35.1–46.3)
EOSINOPHIL # BLD AUTO: 0.13 X10(3) UL (ref 0–0.7)
EOSINOPHIL NFR BLD AUTO: 2.2 %
ERYTHROCYTE [DISTWIDTH] IN BLOOD BY AUTOMATED COUNT: 13.7 % (ref 11–15)
HCT VFR BLD AUTO: 42 %
HGB BLD-MCNC: 13.3 G/DL
IMM GRANULOCYTES # BLD AUTO: 0.01 X10(3) UL (ref 0–1)
IMM GRANULOCYTES NFR BLD: 0.2 %
LYMPHOCYTES # BLD AUTO: 0.99 X10(3) UL (ref 1–4)
LYMPHOCYTES NFR BLD AUTO: 16.4 %
MCH RBC QN AUTO: 31.3 PG (ref 26–34)
MCHC RBC AUTO-ENTMCNC: 31.7 G/DL (ref 31–37)
MCV RBC AUTO: 98.8 FL
MONOCYTES # BLD AUTO: 0.57 X10(3) UL (ref 0.1–1)
MONOCYTES NFR BLD AUTO: 9.5 %
NEUTROPHILS # BLD AUTO: 4.29 X10 (3) UL (ref 1.5–7.7)
NEUTROPHILS # BLD AUTO: 4.29 X10(3) UL (ref 1.5–7.7)
NEUTROPHILS NFR BLD AUTO: 71 %
PLATELET # BLD AUTO: 308 10(3)UL (ref 150–450)
RBC # BLD AUTO: 4.25 X10(6)UL
TSI SER-ACNC: 1.52 MIU/ML (ref 0.36–3.74)
WBC # BLD AUTO: 6 X10(3) UL (ref 4–11)

## 2022-10-04 PROCEDURE — 84443 ASSAY THYROID STIM HORMONE: CPT

## 2022-10-04 PROCEDURE — 36415 COLL VENOUS BLD VENIPUNCTURE: CPT

## 2022-10-04 PROCEDURE — 82306 VITAMIN D 25 HYDROXY: CPT | Performed by: FAMILY MEDICINE

## 2022-10-04 PROCEDURE — 85025 COMPLETE CBC W/AUTO DIFF WBC: CPT

## 2022-10-10 RX ORDER — FLUOXETINE HYDROCHLORIDE 20 MG/1
CAPSULE ORAL
Qty: 30 CAPSULE | Refills: 2 | OUTPATIENT
Start: 2022-10-10

## 2023-01-19 ENCOUNTER — OFFICE VISIT (OUTPATIENT)
Dept: OBGYN CLINIC | Facility: CLINIC | Age: 43
End: 2023-01-19
Payer: COMMERCIAL

## 2023-01-19 VITALS
HEIGHT: 64 IN | DIASTOLIC BLOOD PRESSURE: 72 MMHG | BODY MASS INDEX: 28.49 KG/M2 | SYSTOLIC BLOOD PRESSURE: 110 MMHG | WEIGHT: 166.88 LBS

## 2023-01-19 DIAGNOSIS — N92.6 IRREGULAR PERIODS: Primary | ICD-10-CM

## 2023-01-19 DIAGNOSIS — R10.2 PELVIC PAIN: ICD-10-CM

## 2023-01-19 PROCEDURE — 99213 OFFICE O/P EST LOW 20 MIN: CPT | Performed by: OBSTETRICS & GYNECOLOGY

## 2023-01-19 PROCEDURE — 3078F DIAST BP <80 MM HG: CPT | Performed by: OBSTETRICS & GYNECOLOGY

## 2023-01-19 PROCEDURE — 3074F SYST BP LT 130 MM HG: CPT | Performed by: OBSTETRICS & GYNECOLOGY

## 2023-01-19 PROCEDURE — 3008F BODY MASS INDEX DOCD: CPT | Performed by: OBSTETRICS & GYNECOLOGY

## 2023-01-24 ENCOUNTER — ULTRASOUND ENCOUNTER (OUTPATIENT)
Dept: OBGYN CLINIC | Facility: CLINIC | Age: 43
End: 2023-01-24
Payer: COMMERCIAL

## 2023-01-24 DIAGNOSIS — N92.6 IRREGULAR PERIODS: ICD-10-CM

## 2023-01-24 DIAGNOSIS — R10.2 PELVIC PAIN: ICD-10-CM

## 2023-01-24 PROCEDURE — 76856 US EXAM PELVIC COMPLETE: CPT | Performed by: OBSTETRICS & GYNECOLOGY

## 2023-01-25 NOTE — PROGRESS NOTES
Released to [employer] and message from provider/results was viewed by patient. Next Appt:    With Obstetrics/Gynecology (Tino Hicks MD)01/31/2023 at 10:30 AM

## 2023-01-31 ENCOUNTER — OFFICE VISIT (OUTPATIENT)
Dept: OBGYN CLINIC | Facility: CLINIC | Age: 43
End: 2023-01-31
Payer: COMMERCIAL

## 2023-01-31 VITALS
DIASTOLIC BLOOD PRESSURE: 70 MMHG | SYSTOLIC BLOOD PRESSURE: 110 MMHG | HEIGHT: 64 IN | BODY MASS INDEX: 28.34 KG/M2 | WEIGHT: 166 LBS

## 2023-01-31 DIAGNOSIS — N92.0 EXCESSIVE OR FREQUENT MENSTRUATION: ICD-10-CM

## 2023-01-31 DIAGNOSIS — D25.0 SUBMUCOUS UTERINE FIBROID: Primary | ICD-10-CM

## 2023-01-31 PROCEDURE — 3078F DIAST BP <80 MM HG: CPT | Performed by: OBSTETRICS & GYNECOLOGY

## 2023-01-31 PROCEDURE — 3074F SYST BP LT 130 MM HG: CPT | Performed by: OBSTETRICS & GYNECOLOGY

## 2023-01-31 PROCEDURE — 99213 OFFICE O/P EST LOW 20 MIN: CPT | Performed by: OBSTETRICS & GYNECOLOGY

## 2023-01-31 PROCEDURE — 3008F BODY MASS INDEX DOCD: CPT | Performed by: OBSTETRICS & GYNECOLOGY

## 2023-02-02 ENCOUNTER — TELEPHONE (OUTPATIENT)
Dept: OBGYN CLINIC | Facility: CLINIC | Age: 43
End: 2023-02-02

## 2023-02-02 DIAGNOSIS — N92.4 EXCESSIVE BLEEDING IN PREMENOPAUSAL PERIOD: Primary | ICD-10-CM

## 2023-02-02 DIAGNOSIS — U07.1 COVID: ICD-10-CM

## 2023-02-02 DIAGNOSIS — D25.0 FIBROIDS, SUBMUCOSAL: ICD-10-CM

## 2023-03-03 ENCOUNTER — TELEPHONE (OUTPATIENT)
Dept: OBGYN CLINIC | Facility: CLINIC | Age: 43
End: 2023-03-03

## 2023-03-03 NOTE — TELEPHONE ENCOUNTER
Pt would like to know if there is a sooner surgery date than the one currently scheduled. Scribe Attestation (For Scribes USE Only)... Attending Attestation (For Attendings USE Only).../Scribe Attestation (For Scribes USE Only)... Complex Repair And O-L Flap Text: The defect edges were debeveled with a #15 scalpel blade.  The primary defect was closed partially with a complex linear closure.  Given the location of the remaining defect, shape of the defect and the proximity to free margins an O-L flap was deemed most appropriate for complete closure of the defect.  Using a sterile surgical marker, an appropriate flap was drawn incorporating the defect and placing the expected incisions within the relaxed skin tension lines where possible.    The area thus outlined was incised deep to adipose tissue with a #15 scalpel blade.  The skin margins were undermined to an appropriate distance in all directions utilizing iris scissors.

## 2023-04-05 ENCOUNTER — OFFICE VISIT (OUTPATIENT)
Dept: GASTROENTEROLOGY | Facility: CLINIC | Age: 43
End: 2023-04-05

## 2023-04-05 VITALS
WEIGHT: 169.81 LBS | BODY MASS INDEX: 28.99 KG/M2 | DIASTOLIC BLOOD PRESSURE: 79 MMHG | HEART RATE: 87 BPM | HEIGHT: 64 IN | SYSTOLIC BLOOD PRESSURE: 124 MMHG

## 2023-04-05 DIAGNOSIS — K59.00 CONSTIPATION, UNSPECIFIED CONSTIPATION TYPE: Primary | ICD-10-CM

## 2023-04-05 DIAGNOSIS — R14.0 BLOATING: ICD-10-CM

## 2023-04-05 DIAGNOSIS — A04.8 HELICOBACTER PYLORI (H. PYLORI) INFECTION: ICD-10-CM

## 2023-04-05 PROCEDURE — 3008F BODY MASS INDEX DOCD: CPT | Performed by: NURSE PRACTITIONER

## 2023-04-05 PROCEDURE — 3074F SYST BP LT 130 MM HG: CPT | Performed by: NURSE PRACTITIONER

## 2023-04-05 PROCEDURE — 3078F DIAST BP <80 MM HG: CPT | Performed by: NURSE PRACTITIONER

## 2023-04-05 PROCEDURE — 99244 OFF/OP CNSLTJ NEW/EST MOD 40: CPT | Performed by: NURSE PRACTITIONER

## 2023-04-05 RX ORDER — LINACLOTIDE 72 UG/1
72 CAPSULE, GELATIN COATED ORAL DAILY
Qty: 30 CAPSULE | Refills: 3 | Status: SHIPPED | OUTPATIENT
Start: 2023-04-05 | End: 2023-05-05

## 2023-04-05 NOTE — PATIENT INSTRUCTIONS
-hpylori testing  -start linzess trial  -tio arcos  -consider treating again for sibo w/ rifaximin  -consider colonoscopy

## 2023-04-17 ENCOUNTER — LAB ENCOUNTER (OUTPATIENT)
Dept: LAB | Age: 43
End: 2023-04-17
Attending: OBSTETRICS & GYNECOLOGY
Payer: COMMERCIAL

## 2023-04-17 DIAGNOSIS — U07.1 COVID: ICD-10-CM

## 2023-04-18 LAB — SARS-COV-2 RNA RESP QL NAA+PROBE: NOT DETECTED

## 2023-04-20 ENCOUNTER — ANESTHESIA EVENT (OUTPATIENT)
Dept: SURGERY | Facility: HOSPITAL | Age: 43
End: 2023-04-20
Payer: COMMERCIAL

## 2023-04-20 ENCOUNTER — HOSPITAL ENCOUNTER (OUTPATIENT)
Facility: HOSPITAL | Age: 43
Setting detail: HOSPITAL OUTPATIENT SURGERY
Discharge: HOME | End: 2023-04-20
Attending: OBSTETRICS & GYNECOLOGY | Admitting: OBSTETRICS & GYNECOLOGY
Payer: COMMERCIAL

## 2023-04-20 ENCOUNTER — ANESTHESIA (OUTPATIENT)
Dept: SURGERY | Facility: HOSPITAL | Age: 43
End: 2023-04-20
Payer: COMMERCIAL

## 2023-04-20 VITALS
BODY MASS INDEX: 28.51 KG/M2 | DIASTOLIC BLOOD PRESSURE: 75 MMHG | HEIGHT: 64 IN | RESPIRATION RATE: 18 BRPM | OXYGEN SATURATION: 100 % | SYSTOLIC BLOOD PRESSURE: 117 MMHG | WEIGHT: 167 LBS | HEART RATE: 78 BPM | TEMPERATURE: 98 F

## 2023-04-20 DIAGNOSIS — N92.4 EXCESSIVE BLEEDING IN PREMENOPAUSAL PERIOD: ICD-10-CM

## 2023-04-20 DIAGNOSIS — D25.0 FIBROIDS, SUBMUCOSAL: ICD-10-CM

## 2023-04-20 LAB — B-HCG UR QL: NEGATIVE

## 2023-04-20 PROCEDURE — 88305 TISSUE EXAM BY PATHOLOGIST: CPT | Performed by: OBSTETRICS & GYNECOLOGY

## 2023-04-20 PROCEDURE — 81025 URINE PREGNANCY TEST: CPT

## 2023-04-20 PROCEDURE — 0U598ZZ DESTRUCTION OF UTERUS, VIA NATURAL OR ARTIFICIAL OPENING ENDOSCOPIC: ICD-10-PCS | Performed by: OBSTETRICS & GYNECOLOGY

## 2023-04-20 PROCEDURE — 0U5B8ZZ DESTRUCTION OF ENDOMETRIUM, VIA NATURAL OR ARTIFICIAL OPENING ENDOSCOPIC: ICD-10-PCS | Performed by: OBSTETRICS & GYNECOLOGY

## 2023-04-20 RX ORDER — MORPHINE SULFATE 4 MG/ML
2 INJECTION, SOLUTION INTRAMUSCULAR; INTRAVENOUS EVERY 10 MIN PRN
Status: DISCONTINUED | OUTPATIENT
Start: 2023-04-20 | End: 2023-04-20

## 2023-04-20 RX ORDER — ACETAMINOPHEN 500 MG
1000 TABLET ORAL ONCE
Status: COMPLETED | OUTPATIENT
Start: 2023-04-20 | End: 2023-04-20

## 2023-04-20 RX ORDER — MORPHINE SULFATE 10 MG/ML
6 INJECTION, SOLUTION INTRAMUSCULAR; INTRAVENOUS EVERY 10 MIN PRN
Status: DISCONTINUED | OUTPATIENT
Start: 2023-04-20 | End: 2023-04-20

## 2023-04-20 RX ORDER — MORPHINE SULFATE 4 MG/ML
4 INJECTION, SOLUTION INTRAMUSCULAR; INTRAVENOUS EVERY 10 MIN PRN
Status: DISCONTINUED | OUTPATIENT
Start: 2023-04-20 | End: 2023-04-20

## 2023-04-20 RX ORDER — HYDROMORPHONE HYDROCHLORIDE 1 MG/ML
0.6 INJECTION, SOLUTION INTRAMUSCULAR; INTRAVENOUS; SUBCUTANEOUS EVERY 5 MIN PRN
Status: DISCONTINUED | OUTPATIENT
Start: 2023-04-20 | End: 2023-04-20

## 2023-04-20 RX ORDER — MIDAZOLAM HYDROCHLORIDE 1 MG/ML
INJECTION INTRAMUSCULAR; INTRAVENOUS AS NEEDED
Status: DISCONTINUED | OUTPATIENT
Start: 2023-04-20 | End: 2023-04-20 | Stop reason: SURG

## 2023-04-20 RX ORDER — ONDANSETRON 2 MG/ML
INJECTION INTRAMUSCULAR; INTRAVENOUS AS NEEDED
Status: DISCONTINUED | OUTPATIENT
Start: 2023-04-20 | End: 2023-04-20 | Stop reason: SURG

## 2023-04-20 RX ORDER — PROCHLORPERAZINE EDISYLATE 5 MG/ML
5 INJECTION INTRAMUSCULAR; INTRAVENOUS EVERY 8 HOURS PRN
Status: DISCONTINUED | OUTPATIENT
Start: 2023-04-20 | End: 2023-04-20

## 2023-04-20 RX ORDER — NALOXONE HYDROCHLORIDE 0.4 MG/ML
80 INJECTION, SOLUTION INTRAMUSCULAR; INTRAVENOUS; SUBCUTANEOUS AS NEEDED
Status: DISCONTINUED | OUTPATIENT
Start: 2023-04-20 | End: 2023-04-20

## 2023-04-20 RX ORDER — HYDROMORPHONE HYDROCHLORIDE 1 MG/ML
0.2 INJECTION, SOLUTION INTRAMUSCULAR; INTRAVENOUS; SUBCUTANEOUS EVERY 5 MIN PRN
Status: DISCONTINUED | OUTPATIENT
Start: 2023-04-20 | End: 2023-04-20

## 2023-04-20 RX ORDER — SODIUM CHLORIDE, SODIUM LACTATE, POTASSIUM CHLORIDE, CALCIUM CHLORIDE 600; 310; 30; 20 MG/100ML; MG/100ML; MG/100ML; MG/100ML
INJECTION, SOLUTION INTRAVENOUS CONTINUOUS
Status: DISCONTINUED | OUTPATIENT
Start: 2023-04-20 | End: 2023-04-20

## 2023-04-20 RX ORDER — LIDOCAINE HYDROCHLORIDE 10 MG/ML
INJECTION, SOLUTION EPIDURAL; INFILTRATION; INTRACAUDAL; PERINEURAL AS NEEDED
Status: DISCONTINUED | OUTPATIENT
Start: 2023-04-20 | End: 2023-04-20 | Stop reason: SURG

## 2023-04-20 RX ORDER — ONDANSETRON 2 MG/ML
4 INJECTION INTRAMUSCULAR; INTRAVENOUS EVERY 8 HOURS PRN
Status: DISCONTINUED | OUTPATIENT
Start: 2023-04-20 | End: 2023-04-20

## 2023-04-20 RX ORDER — DEXAMETHASONE SODIUM PHOSPHATE 4 MG/ML
VIAL (ML) INJECTION AS NEEDED
Status: DISCONTINUED | OUTPATIENT
Start: 2023-04-20 | End: 2023-04-20 | Stop reason: SURG

## 2023-04-20 RX ORDER — ONDANSETRON 2 MG/ML
4 INJECTION INTRAMUSCULAR; INTRAVENOUS EVERY 6 HOURS PRN
Status: DISCONTINUED | OUTPATIENT
Start: 2023-04-20 | End: 2023-04-20

## 2023-04-20 RX ORDER — ONDANSETRON 4 MG/1
4 TABLET, FILM COATED ORAL EVERY 8 HOURS PRN
Status: DISCONTINUED | OUTPATIENT
Start: 2023-04-20 | End: 2023-04-20

## 2023-04-20 RX ORDER — HYDROMORPHONE HYDROCHLORIDE 1 MG/ML
0.4 INJECTION, SOLUTION INTRAMUSCULAR; INTRAVENOUS; SUBCUTANEOUS EVERY 5 MIN PRN
Status: DISCONTINUED | OUTPATIENT
Start: 2023-04-20 | End: 2023-04-20

## 2023-04-20 RX ADMIN — DEXAMETHASONE SODIUM PHOSPHATE 4 MG: 4 MG/ML VIAL (ML) INJECTION at 07:46:00

## 2023-04-20 RX ADMIN — SODIUM CHLORIDE, SODIUM LACTATE, POTASSIUM CHLORIDE, CALCIUM CHLORIDE: 600; 310; 30; 20 INJECTION, SOLUTION INTRAVENOUS at 07:34:00

## 2023-04-20 RX ADMIN — MIDAZOLAM HYDROCHLORIDE 2 MG: 1 INJECTION INTRAMUSCULAR; INTRAVENOUS at 07:36:00

## 2023-04-20 RX ADMIN — LIDOCAINE HYDROCHLORIDE 50 MG: 10 INJECTION, SOLUTION EPIDURAL; INFILTRATION; INTRACAUDAL; PERINEURAL at 07:40:00

## 2023-04-20 RX ADMIN — ONDANSETRON 4 MG: 2 INJECTION INTRAMUSCULAR; INTRAVENOUS at 07:46:00

## 2023-04-20 NOTE — OPERATIVE REPORT
[facility] POST ANESTHESIA CARE UNIT  Operative Note     Dion Wright Location: OR   CSN [id number] MRN [id number]   Admission Date 4/20/2023 Operation Date 4/20/2023   Attending Physician Zamzam Dang MD Operating Physician Annalee Girard MD      Preoperative Diagnosis: Excessive bleeding in premenopausal period [N92.4]  Fibroids, submucosal [D25.0]     Postoperative Diagnosis: Excessive bleeding in premenopausal period [N92. 4]Fibroids, submucosal [D25.0]     Procedure Performed:   Myomectomy hysteroscopic with myosure, DEFICIT 75ML, novasure endometrial ablation     Primary Surgeon: Annalee Girard MD      Surgical Findings: Uterus 8 cm: cavity length 4.0 cm, cavity width 3.0 cm with posterior wall submucosal fibroid     Anesthesia: Choice     Complications: None      Specimen: EM contents      Condition: Good     Estimated Blood Loss: Blood Output: 10 mL (4/20/2023  8:43 AM)     Summary of Case: See dictation       Annalee Girard MD  4/20/2023  8:53 AM

## 2023-04-20 NOTE — ANESTHESIA PROCEDURE NOTES
Airway  Date/Time: 4/20/2023 7:42 AM  Urgency: Elective    Airway not difficult    General Information and Staff    Patient location during procedure: OR  Anesthesiologist: Yousif Martinez DO  Performed: anesthesiologist   Performed by: Yousif Martinez DO  Authorized by: Yousif Martinez DO      Indications and Patient Condition  Indications for airway management: anesthesia  Spontaneous Ventilation: absent  Sedation level: deep  Preoxygenated: yes  Patient position: sniffing  Mask difficulty assessment: 1 - vent by mask    Final Airway Details  Final airway type: supraglottic airway      Successful airway: classic  Size 4       Number of attempts at approach: 1  Number of other approaches attempted: 0

## 2023-04-20 NOTE — OPERATIVE REPORT
[facility]    PATIENT'S NAME: Linda Real   ATTENDING PHYSICIAN: Becka Mcdonnell MD   OPERATING PHYSICIAN: Becka Mcdonnell MD   PATIENT ACCOUNT#:   [de-identified]    LOCATION:  [place] [address]  MEDICAL RECORD #:   [id number]       YOB: 1980  ADMISSION DATE:       04/20/2023      OPERATION DATE:  04/20/2023    OPERATIVE REPORT      PREOPERATIVE DIAGNOSIS:    1. Menorrhagia. 2.   Submucosal fibroid. POSTOPERATIVE DIAGNOSIS:    1. Menorrhagia. 2.   Submucosal fibroid. PROCEDURE:  Hysteroscopy with myomectomy, resection of a submucosal fibroid with MyoSure and an endometrial ablation with NovaSure. ANESTHESIA:  Choice. COMPLICATIONS:  None. ESTIMATED BLOOD LOSS:  10 mL. FINDINGS:  The uterus was found to be 8 cm in total length. The cavity length was 4 cm. The cavity width was 3 cm, and a posterior wall submucosal fibroid was noted. Otherwise normal findings with normal tubal ostia bilaterally. OPERATIVE TECHNIQUE:  The patient was taken to the operating room, placed on the table in dorsal lithotomy position, was prepped and draped in usual sterile manner. A bivalve speculum was placed into the vaginal canal.  The anterior lip of the cervix was grasped with a toothed tenaculum and the uterine sound was placed with a measurement of 8 cm as dictated above. The Hegar dilators were successively used up to 5 mm. The cervical canal length was noted to be 4 cm. Therefore, the cavity length was also 4 cm. Once the dilators were complete, the hysteroscope was placed and inspection revealed the findings as dictated above. There was noted to be a moderate amount of tissue and a posterior wall submucosal fibroid. The MyoSure device was then placed under hysteroscopic visualization. Resection was performed without difficulty. There was noted to be good result at the end of the procedure with clear uterine cavity and easily visualized tubal ostia bilaterally. The hysteroscope was then removed. The cervix was then dilated further up to 6 mm and the NovaSure device was then introduced. The cavity measurements were obtained with a cavity width of 3 cm. The cavity assessment was subsequently passed and the device was deployed. The duration of the ablation was 1 minute and 2 seconds. At the end of the procedure, the NovaSure device was then removed. There was noted to be good lidia on the end of the device. The tenaculum was removed from the cervix and ring forceps were applied for hemostasis. The speculum and all remainder of vaginal instruments were removed. There was excellent hemostasis noted all times. The patient was then taken out of the dorsal lithotomy position. She was successfully reversed of anesthesia in the operating room and transferred stable to recovery having tolerated the procedure well. All instrument, sponge counts were correct x2 at the end of the procedure.     Dictated By Laura Wu MD  d: 04/20/2023 08:58:39  t: 04/20/2023 13:03:16  Job [id number]  EB/

## 2023-04-21 ENCOUNTER — PATIENT MESSAGE (OUTPATIENT)
Dept: OBGYN CLINIC | Facility: CLINIC | Age: 43
End: 2023-04-21

## 2023-04-24 NOTE — TELEPHONE ENCOUNTER
Grazyna Ortega MD  You 1 hour ago (7:58 AM)     She had minor surgery on Thursday that was a Hysteroscopy then Fri Sat Maxcine March off so should be back to work by now. 48-72 hours is the standard. EB    Called pt, per pt took an extra day off due to having pain over the weekend but returning to work today. Informed of letter, verbalized understanding. From: Flori Isaacs  To: Yelena Samuels MD  Sent: 4/21/2023  4:54 PM CDT  Subject: Previous procedure     Hello I just wanted to see if I could get a note for work explaining I had a procedure and will need to be off work for a few days.  Thank you

## 2023-04-24 NOTE — PROGRESS NOTES
Alejandrina ferrarag from provider was viewed by pt.    Seen by patient Terry Healy on 4/21/2023  2:48 PM

## 2023-05-03 ENCOUNTER — OFFICE VISIT (OUTPATIENT)
Dept: OBGYN CLINIC | Facility: CLINIC | Age: 43
End: 2023-05-03
Payer: COMMERCIAL

## 2023-05-03 VITALS — SYSTOLIC BLOOD PRESSURE: 112 MMHG | WEIGHT: 169 LBS | DIASTOLIC BLOOD PRESSURE: 60 MMHG | BODY MASS INDEX: 29 KG/M2

## 2023-05-03 DIAGNOSIS — Z98.890 STATUS POST HYSTEROSCOPY: Primary | ICD-10-CM

## 2023-05-03 PROCEDURE — 3074F SYST BP LT 130 MM HG: CPT | Performed by: OBSTETRICS & GYNECOLOGY

## 2023-05-03 PROCEDURE — 99213 OFFICE O/P EST LOW 20 MIN: CPT | Performed by: OBSTETRICS & GYNECOLOGY

## 2023-05-03 PROCEDURE — 3078F DIAST BP <80 MM HG: CPT | Performed by: OBSTETRICS & GYNECOLOGY

## 2023-06-29 ENCOUNTER — LAB ENCOUNTER (OUTPATIENT)
Dept: LAB | Facility: REFERENCE LAB | Age: 43
End: 2023-06-29
Attending: FAMILY MEDICINE
Payer: COMMERCIAL

## 2023-06-29 DIAGNOSIS — A04.8 HELICOBACTER PYLORI (H. PYLORI) INFECTION: ICD-10-CM

## 2023-06-29 PROCEDURE — 83013 H PYLORI (C-13) BREATH: CPT

## 2023-07-02 LAB — H PYLORI BREATH TEST: NEGATIVE

## 2023-09-19 ENCOUNTER — OFFICE VISIT (OUTPATIENT)
Dept: OBGYN CLINIC | Facility: CLINIC | Age: 43
End: 2023-09-19
Payer: COMMERCIAL

## 2023-09-19 VITALS — SYSTOLIC BLOOD PRESSURE: 118 MMHG | DIASTOLIC BLOOD PRESSURE: 68 MMHG

## 2023-09-19 DIAGNOSIS — N92.0 EXCESSIVE OR FREQUENT MENSTRUATION: Primary | ICD-10-CM

## 2023-09-19 DIAGNOSIS — Z98.890 STATUS POST HYSTEROSCOPY: ICD-10-CM

## 2023-09-19 PROCEDURE — 3078F DIAST BP <80 MM HG: CPT | Performed by: OBSTETRICS & GYNECOLOGY

## 2023-09-19 PROCEDURE — 99213 OFFICE O/P EST LOW 20 MIN: CPT | Performed by: OBSTETRICS & GYNECOLOGY

## 2023-09-19 PROCEDURE — 3074F SYST BP LT 130 MM HG: CPT | Performed by: OBSTETRICS & GYNECOLOGY

## 2023-09-19 NOTE — PROGRESS NOTES
Cameron Urias is a 37year old female. HPI:   Patient presents with:  Menstrual Problem: Pt c/o heavy periods with clots. Worst since surgery done (Hysteroscopy with myomectomy) 04/2023    Here with return of heavy menstrual flow with clots and left sided cramping. Had successful submucosal myomectomy with Novasure ablation 4/2023 with no issues until past few months when heavy flow and clots have resumed. Discussed pelvic exam today then evaluation with pelvic USN to reassess EM lining and screen for additional fibroids. Medications (Active prior to today's visit):  Current Outpatient Medications   Medication Sig Dispense Refill    mometasone furoate 50 MCG/ACT Nasal Suspension 2 sprays by Nasal route daily. 3 each 1    VALACYCLOVIR 1 G Oral Tab TAKE 1 TABLET(1000 MG) BY MOUTH DAILY 90 tablet 2    cetirizine 10 MG Oral Tab Take 1 tablet (10 mg total) by mouth daily. Flunisolide 25 MCG/ACT (0.025%) Nasal Solution 1 spray by Nasal route daily. FLOVENT HFA 44 MCG/ACT Inhalation Aerosol Inhale 1 puff into the lungs 2 (two) times daily. Respiratory Therapy Supplies (NEBULIZER/TUBING/MOUTHPIECE) Does not apply Kit Use as directed      Albuterol Sulfate  (90 Base) MCG/ACT Inhalation Aero Soln Inhale 2 puffs into the lungs every 6 (six) hours as needed for Wheezing or Shortness of Breath. 1 Inhaler 1    BORIC ACID 600MG V-CAP Insert 1 capsule VAGINALLY nightly for 7-14 nights then 2-3 times weekly as needed.  30 capsule 0       Allergies:  No Known Allergies    /68   LMP 04/03/2023 (Approximate)     PHYSICAL EXAM:   GENERAL: Well developed, well nourished, in no apparent distress  ABDOMEN: Soft, non distended, non tender, no masses  GYNE/:   External Genitalia: normal, no lesions, good perineal support           Vagina: normal mucosa  Uterus: mobile, nontender                     Cervix: no lesions or polyp or bleeding                    R/V: normal perineum, no hemorrhoids  EXTREMITIES: nontender without edema      ASSESSMENT/PLAN:   Assessment       1. Recurrence of excessive or frequent menstruation - history of fibroids  - Pelvic USN ordered    2.  Status post hysteroscopy 4/2023 with submucosal myomectomy and Novasure ablation  - Return to review USN results and next step      Total time spent = 25 minutes  >50% of visit = face to face discussion and coordination of care        9/19/2023  Tenzin Perrin MD

## 2023-09-25 ENCOUNTER — TELEPHONE (OUTPATIENT)
Dept: FAMILY MEDICINE CLINIC | Facility: CLINIC | Age: 43
End: 2023-09-25

## 2023-09-25 DIAGNOSIS — Z02.9 ENCOUNTERS FOR ADMINISTRATIVE PURPOSE: Primary | ICD-10-CM

## 2023-09-25 NOTE — TELEPHONE ENCOUNTER
Patient made [facility] appointment for [facility] for MMR vaccine, chart reviewed. Patient in contact with PCP office regarding blood titers done 2/2020 for MMR. To follow up if needed.

## 2023-10-04 PROBLEM — E55.9 VITAMIN D DEFICIENCY: Status: RESOLVED | Noted: 2019-04-03 | Resolved: 2023-10-04

## 2023-10-04 PROBLEM — Z01.419 WOMEN'S ANNUAL ROUTINE GYNECOLOGICAL EXAMINATION: Status: RESOLVED | Noted: 2021-08-18 | Resolved: 2023-10-04

## 2023-10-04 PROBLEM — A04.8 HELICOBACTER PYLORI INFECTION: Status: RESOLVED | Noted: 2021-07-09 | Resolved: 2023-10-04

## 2023-11-08 ENCOUNTER — HOSPITAL ENCOUNTER (OUTPATIENT)
Dept: ULTRASOUND IMAGING | Facility: HOSPITAL | Age: 43
Discharge: HOME | End: 2023-11-08
Attending: OBSTETRICS & GYNECOLOGY
Payer: COMMERCIAL

## 2023-11-08 DIAGNOSIS — N92.0 EXCESSIVE OR FREQUENT MENSTRUATION: ICD-10-CM

## 2023-11-08 DIAGNOSIS — Z98.890 STATUS POST HYSTEROSCOPY: ICD-10-CM

## 2023-11-08 PROCEDURE — 76830 TRANSVAGINAL US NON-OB: CPT | Performed by: OBSTETRICS & GYNECOLOGY

## 2023-11-08 PROCEDURE — 76856 US EXAM PELVIC COMPLETE: CPT | Performed by: OBSTETRICS & GYNECOLOGY

## 2023-11-10 NOTE — PROGRESS NOTES
Released to [employer] and message from provider/results was viewed by patient.     Seen by patient Serjio Alvarado on 11/10/2023 11:50 AM

## 2023-11-13 ENCOUNTER — OFFICE VISIT (OUTPATIENT)
Dept: SURGERY | Facility: CLINIC | Age: 43
End: 2023-11-13
Payer: COMMERCIAL

## 2023-11-13 DIAGNOSIS — D25.1 INTRAMURAL UTERINE FIBROID: Primary | ICD-10-CM

## 2023-11-13 PROBLEM — D25.0 SUBMUCOUS UTERINE FIBROID: Status: RESOLVED | Noted: 2023-01-31 | Resolved: 2023-11-13

## 2023-11-13 NOTE — PROGRESS NOTES
HPI:     Chief Complaint   Patient presents with    Follow - Up     Pt here for f/u after usn done 11/8/23     USN 11/9/23 results reviewed = 8.7 cm heterogenous uterus with 1.1 cm posterior intramural fibroid and 4.3 mm EM lining. Prior submucosal 1.5 cm fibroid no longer present since 4/2023 [facility] myomectomy but cycles still heavy. Discussed consultation with Dr. Renu Miller to discuss possible Acessa procedure. Questions answered and agrees with plan. Medications (Active prior to today's visit):  Current Outpatient Medications   Medication Sig Dispense Refill    mometasone furoate 50 MCG/ACT Nasal Suspension 2 sprays by Nasal route daily. 3 each 1    VALACYCLOVIR 1 G Oral Tab TAKE 1 TABLET(1000 MG) BY MOUTH DAILY 90 tablet 2    cetirizine 10 MG Oral Tab Take 1 tablet (10 mg total) by mouth daily. Flunisolide 25 MCG/ACT (0.025%) Nasal Solution 1 spray by Nasal route daily. FLOVENT HFA 44 MCG/ACT Inhalation Aerosol Inhale 1 puff into the lungs 2 (two) times daily. Respiratory Therapy Supplies (NEBULIZER/TUBING/MOUTHPIECE) Does not apply Kit Use as directed      Albuterol Sulfate  (90 Base) MCG/ACT Inhalation Aero Soln Inhale 2 puffs into the lungs every 6 (six) hours as needed for Wheezing or Shortness of Breath. 1 Inhaler 1    BORIC ACID 600MG V-CAP Insert 1 capsule VAGINALLY nightly for 7-14 nights then 2-3 times weekly as needed. 30 capsule 0       Allergies:  No Known Allergies    LMP 10/30/2023 (Approximate)        ASSESSMENT/PLAN:   Assessment       1.  Intramural uterine fibroid  - Pre-op consultation Dr. Renu Miller to discuss Acessa      Total time spent = 15 minutes  >50% of visit = face to face discussion and coordination of care        11/13/2023  Hung Bolden MD

## 2023-12-13 ENCOUNTER — OFFICE VISIT (OUTPATIENT)
Dept: OBGYN CLINIC | Facility: CLINIC | Age: 43
End: 2023-12-13
Payer: COMMERCIAL

## 2023-12-13 VITALS
BODY MASS INDEX: 28.85 KG/M2 | SYSTOLIC BLOOD PRESSURE: 110 MMHG | HEIGHT: 64 IN | WEIGHT: 169 LBS | DIASTOLIC BLOOD PRESSURE: 68 MMHG

## 2023-12-13 DIAGNOSIS — D25.1 INTRAMURAL UTERINE FIBROID: ICD-10-CM

## 2023-12-13 DIAGNOSIS — N93.9 ABNORMAL UTERINE BLEEDING: Primary | ICD-10-CM

## 2023-12-13 PROCEDURE — 3078F DIAST BP <80 MM HG: CPT | Performed by: OBSTETRICS & GYNECOLOGY

## 2023-12-13 PROCEDURE — 3008F BODY MASS INDEX DOCD: CPT | Performed by: OBSTETRICS & GYNECOLOGY

## 2023-12-13 PROCEDURE — 99203 OFFICE O/P NEW LOW 30 MIN: CPT | Performed by: OBSTETRICS & GYNECOLOGY

## 2023-12-13 PROCEDURE — 3074F SYST BP LT 130 MM HG: CPT | Performed by: OBSTETRICS & GYNECOLOGY

## 2023-12-13 RX ORDER — GABAPENTIN 300 MG/1
300 CAPSULE ORAL EVERY 8 HOURS PRN
Qty: 30 CAPSULE | Refills: 0 | Status: SHIPPED | OUTPATIENT
Start: 2023-12-13

## 2023-12-13 RX ORDER — IBUPROFEN 600 MG/1
600 TABLET ORAL EVERY 6 HOURS PRN
Qty: 60 TABLET | Refills: 0 | Status: SHIPPED | OUTPATIENT
Start: 2023-12-13

## 2023-12-13 NOTE — PROGRESS NOTES
New Patient GYN History and Physical  [facility] OB/GYN    CHIEF COMPLAINT:    Chief Complaint   Patient presents with    Consult     fibroids      HISTORY OF PRESENT ILLNESS:   Manjinder Soriano is a 37year old female [id number]  who presents for     Patient's last menstrual period was 2023 (approximate). Monthly / regular, 5 days, heavy most of the time (tampon + liner change 4-5 times per day due to soaking through), mild cramps - worse pelvic pain, sometimes before period other during or after. + sex -  x 8 years  Some pain with sex  No bleeding with sex    Tlh with b/l salpingectomy, cystoscopy 24      PAST MEDICAL HISTORY:   Past Medical History:   Diagnosis Date    Anemia     YRS AGO    Asthma     BV (bacterial vaginosis)     Dysmenorrhea     Dyspareunia     Esophageal reflux     Fibroids     Heartburn     IBS (irritable bowel syndrome)     Menstrual suppression 2021    Migraines     Submucous uterine fibroid 2023        PAST SURGICAL HISTORY:   Past Surgical History:   Procedure Laterality Date    Colposcopy, cervix w/upper adjacent vagina; w/biopsy(s), cervix      Hysteroscopy  2023    Hysteroscopy with myomectomy, resection of a submucosal fibroid with MyoSure and an endometrial ablation with NovaSure. Leep  2013        PAST OB HISTORY:  OB History    Para Term  AB Living   2 2 1 1   2   SAB IAB Ectopic Multiple Live Births           2      # Outcome Date GA Lbr Cuco/2nd Weight Sex Delivery Anes PTL Lv   2  01 36w0d   F NORMAL SPONT  Y NICOLE      Complications: Abruptio Placenta   1 Term 98 39w5d   M NORMAL SPONT  N NICOLE       CURRENT MEDICATIONS:      Current Outpatient Medications:     gabapentin 300 MG Oral Cap, Take 1 capsule (300 mg total) by mouth every 8 (eight) hours as needed. , Disp: 30 capsule, Rfl: 0    ibuprofen 600 MG Oral Tab, Take 1 tablet (600 mg total) by mouth every 6 (six) hours as needed for Pain., Disp: 60 tablet, Rfl: 0    mometasone furoate 50 MCG/ACT Nasal Suspension, 2 sprays by Nasal route daily. , Disp: 3 each, Rfl: 1    VALACYCLOVIR 1 G Oral Tab, TAKE 1 TABLET(1000 MG) BY MOUTH DAILY, Disp: 90 tablet, Rfl: 2    cetirizine 10 MG Oral Tab, Take 1 tablet (10 mg total) by mouth daily. , Disp: , Rfl:     Flunisolide 25 MCG/ACT (0.025%) Nasal Solution, 1 spray by Nasal route daily. , Disp: , Rfl:     FLOVENT HFA 44 MCG/ACT Inhalation Aerosol, Inhale 1 puff into the lungs 2 (two) times daily. , Disp: , Rfl:     Respiratory Therapy Supplies (NEBULIZER/TUBING/MOUTHPIECE) Does not apply Kit, Use as directed, Disp: , Rfl:     Albuterol Sulfate  (90 Base) MCG/ACT Inhalation Aero Soln, Inhale 2 puffs into the lungs every 6 (six) hours as needed for Wheezing or Shortness of Breath., Disp: 1 Inhaler, Rfl: 1    BORIC ACID 600MG V-CAP, Insert 1 capsule VAGINALLY nightly for 7-14 nights then 2-3 times weekly as needed. , Disp: 30 capsule, Rfl: 0    ALLERGIES:  No Known Allergies    SOCIAL HISTORY:  Social History     Socioeconomic History    Marital status:    Occupational History    Occupation:     Occupation:    Tobacco Use    Smoking status: Never    Smokeless tobacco: Never   Vaping Use    Vaping Use: Never used   Substance and Sexual Activity    Alcohol use: Yes     Comment: socially    Drug use: No    Sexual activity: Yes     Partners: Male   Other Topics Concern    Caffeine Concern No    Exercise No    Seat Belt No    Special Diet No    Stress Concern No    Weight Concern Yes     Comment: I think im too fat    Blood Transfusions No       FAMILY HISTORY:  Family History   Problem Relation Age of Onset    Hypertension Father     Other (Covid) Father         Pneumonia     Hypertension Mother     Diabetes Maternal Grandfather     Diabetes Maternal Uncle     Breast Cancer Neg     Ovarian Cancer Neg     Colon Cancer Neg      ASSESSMENTS:  PHYSICAL EXAM:   Patient's last menstrual period was 11/19/2023 (approximate). Vitals:    12/13/23 1251   BP: 110/68   Weight: 169 lb (76.7 kg)   Height: 64\"     CONSTITUTIONAL: Awake, alert, cooperative, no apparent distress, and appears stated age   NECK: Supple, symmetrical, trachea midline, no adenopathy, thyroid symmetric, not enlarged and no tenderness  LUNGS: No excess work of breathing  ABDOMEN: Soft, non-distended, non-tender, no masses palpated    MUSCULOSKELETAL: There is no redness, warmth, or swelling of the joints. Tone is normal.  NEUROLOGIC: Patient is awake, alert and oriented to name, place and time. Casual gait is normal.  SKIN: no bruising or bleeding and no rashes  PSYCHIATRIC: Behavior:  Appropriate  Mood:  appropriate  ASSESSMENT AND PLAN:  Abnormal uterie bleeding  - I suspect symptoms are related to adenomyosis and not so much the fibroid. But no good way to definitively dx this pre-operatively. - reviewed options to tx including medication, acessa, hysterectomy. Most intersted in hysterectomy  - risks of surgery reviewed, including pain, bleeding, infection, injury to surrounding structures (bowel, bladder, ureters, etc.) possibly requiring another surgery to correct), need for laparotomy. All questions answered. - surgery req for TLH with b/l salpingectomy with cysotoscopy on 1/31/24.    - rx for post-op pain meds gabapentin and ibuprofen sent today.   follow up 2 week post-o or as needed  Sy Brooks DO

## 2023-12-14 ENCOUNTER — TELEPHONE (OUTPATIENT)
Dept: OBGYN CLINIC | Facility: CLINIC | Age: 43
End: 2023-12-14

## 2023-12-14 DIAGNOSIS — N93.9 ABNORMAL UTERINE BLEEDING (AUB): Primary | ICD-10-CM

## 2023-12-14 DIAGNOSIS — D21.9 FIBROID: ICD-10-CM

## 2023-12-14 NOTE — TELEPHONE ENCOUNTER
----- Message from Piotr Ramirez DO sent at 12/13/2023  2:23 PM CST -----  Regarding: please sched surg  Please schedule the following surgery:    Procedure: total laparoscopic hysterectomy with bilateral salpingectomy and cystoscopy  Assist: mary marrero CSA  Date: 1/31/24                               Time Requested: 0730  Dx: abnormal uterine bleeding, fibroid  Pre-op appt: na  Admission type: outpatient  Department of discharge(SDS/Floor): sds  Expected length of stay: na  Procedure length time (please enter amount you are requesting): 2 hours  Recovery time (patients always ask): 4-6 weeks  Medical Clearance: (Y/N) n  Post- Op f/u appt time frame: 2 and 6 weeks post-op    Thank you!   ~RD

## 2023-12-26 ENCOUNTER — TELEPHONE (OUTPATIENT)
Dept: OBGYN CLINIC | Facility: CLINIC | Age: 43
End: 2023-12-26

## 2023-12-26 NOTE — TELEPHONE ENCOUNTER
JUDITH ([facility] [employer]) received at Forms Department. Missing Auth - sent [employer] message. Logged for processing.

## 2024-01-02 ENCOUNTER — PATIENT MESSAGE (OUTPATIENT)
Dept: SURGERY | Facility: CLINIC | Age: 44
End: 2024-01-02

## 2024-01-03 ENCOUNTER — TELEPHONE (OUTPATIENT)
Dept: OBGYN CLINIC | Facility: CLINIC | Age: 44
End: 2024-01-03

## 2024-01-03 NOTE — TELEPHONE ENCOUNTER
If patient calls, please schedule 2 & 6  week post op w/ Dr. Fitzpatrick  on or around 2/14 & 3/13 .

## 2024-01-03 NOTE — TELEPHONE ENCOUNTER
From: Sarah Sawyer  To: Kathia ROJO Biандрей  Sent: 1/2/2024 4:42 PM CST  Subject: Supplements     Hello I’m wondering if there is a prescription I can get for perimenopause? Thank you.

## 2024-01-11 NOTE — TELEPHONE ENCOUNTER
Dr. Fitzpatrick,     *The ACKNOWLEDGE button has been moved to the top right ribbon*    Please sign off on form if you agree to:  FMLA (surgery & recovery), start date: 2/2/24, end date: 6 weeks post op  (place your signature on the first page only)    -From your Inbasket, Highlight the patient and click Chart   -Double click the 12/26/23 Forms Completion telephone encounter  -Scroll down to the Media section   -Click the blue Hyperlink:  JUDITH Fitzpatrick 1/11/24  -Click Acknowledge located in the top right ribbon/menu   -Drag the mouse into the blank space of the document and a + sign will appear. Left click to   electronically sign the document.     Thank you,    Lynn BEE

## 2024-01-29 ENCOUNTER — LAB ENCOUNTER (OUTPATIENT)
Dept: LAB | Facility: HOSPITAL | Age: 44
End: 2024-01-29
Attending: OBSTETRICS & GYNECOLOGY
Payer: COMMERCIAL

## 2024-01-29 DIAGNOSIS — Z01.818 PRE-OP TESTING: ICD-10-CM

## 2024-01-29 LAB
ANTIBODY SCREEN: NEGATIVE
BASOPHILS # BLD AUTO: 0.04 X10(3) UL (ref 0–0.2)
BASOPHILS NFR BLD AUTO: 0.6 %
DEPRECATED RDW RBC AUTO: 47.3 FL (ref 35.1–46.3)
EOSINOPHIL # BLD AUTO: 0.26 X10(3) UL (ref 0–0.7)
EOSINOPHIL NFR BLD AUTO: 4 %
ERYTHROCYTE [DISTWIDTH] IN BLOOD BY AUTOMATED COUNT: 13.9 % (ref 11–15)
HCT VFR BLD AUTO: 38.9 %
HGB BLD-MCNC: 13 G/DL
IMM GRANULOCYTES # BLD AUTO: 0.02 X10(3) UL (ref 0–1)
IMM GRANULOCYTES NFR BLD: 0.3 %
LYMPHOCYTES # BLD AUTO: 1 X10(3) UL (ref 1–4)
LYMPHOCYTES NFR BLD AUTO: 15.4 %
MCH RBC QN AUTO: 31.3 PG (ref 26–34)
MCHC RBC AUTO-ENTMCNC: 33.4 G/DL (ref 31–37)
MCV RBC AUTO: 93.7 FL
MONOCYTES # BLD AUTO: 0.72 X10(3) UL (ref 0.1–1)
MONOCYTES NFR BLD AUTO: 11.1 %
NEUTROPHILS # BLD AUTO: 4.44 X10 (3) UL (ref 1.5–7.7)
NEUTROPHILS # BLD AUTO: 4.44 X10(3) UL (ref 1.5–7.7)
NEUTROPHILS NFR BLD AUTO: 68.6 %
PLATELET # BLD AUTO: 263 10(3)UL (ref 150–450)
RBC # BLD AUTO: 4.15 X10(6)UL
RH BLOOD TYPE: POSITIVE
RH BLOOD TYPE: POSITIVE
WBC # BLD AUTO: 6.5 X10(3) UL (ref 4–11)

## 2024-01-29 PROCEDURE — 36415 COLL VENOUS BLD VENIPUNCTURE: CPT

## 2024-01-29 PROCEDURE — 85025 COMPLETE CBC W/AUTO DIFF WBC: CPT

## 2024-01-29 PROCEDURE — 86850 RBC ANTIBODY SCREEN: CPT

## 2024-01-29 PROCEDURE — 86900 BLOOD TYPING SEROLOGIC ABO: CPT

## 2024-01-29 PROCEDURE — 86901 BLOOD TYPING SEROLOGIC RH(D): CPT

## 2024-01-29 NOTE — DISCHARGE INSTRUCTIONS
Post Operative Home Care Instructions     We hope you were pleased with your care at [facility].  We wish you the best outcome and overall experience.  These instructions will help to minimize pain, limit the risk for an infection, and improve the likelihood of a successful recovery.    What to Expect:  Abdominal cramping   Vaginal bleeding for about 4-6 weeks   Constipation is common after surgery. Please keep up with Colace twice per day and Miralax as needed.     Prescribed medication:  - prescriptions for gabapentin and ibuprofen were sent to your preferred pharmacy.   - take the gabapentin every 8 hours for the next 3-4 days, then you can back off and take only as needed for the rest of the week.  - take the ibuprofen every 6 hours for the next 3-4 days (alternate with the gabapentin), then you can back off and take only as needed for pain while you continue healing.      Over-The-Counter Medication  Non-prescription anti-inflammatory medications can also help to ease the pain.  You may take Tylenol  Colace or Metamucil for Constipation  Drink a full glass of water with oral medication and take as directed.    Wound Care  The following instructions will promote proper healing and help to prevent infection  Please use soap and water over incision   Pat your incision dry and leave open to air if possible   If you have steri - strips, then please remove after 4-5 days from your surgery. You may remove after a shower to decrease discomfort.   Do not replace the Steri-Strips, if they come off.  If the tapes come off, leave them off and keep the incision clean.  You do not need to cover the incision or put any medications on the incision.  If you have staples make sure to schedule your follow up appointment for staple removal.     Bathing/Showers  You may resume showers  No baths, swimming, hot tubs for at least 6 weeks.     Home Medication  Resume your home medications as instructed    Diet   Resume  your normal diet    Activity  Refrain from vaginal intercourse, vaginal suppositories, tampon use or douches until after your follow up visit   No exercising for 4 weeks  You may climb stairs minimally for the 1st week.    Do not do heavy housework for at least 2-3 weeks    Return to Work or School  You may return to work in 4-6weeks  Contact your gynecologist's office, if you need a medical release. ([phone])    Driving  Avoid driving for 1-2 weeks or sooner if not taking narcotics. No driving while taking narcotics.     Follow-up Appointment with Your Obstetrician  Call your Gynecologist's office today for an incision check appointment and/or follow up appointment.   The number is [phone].  Verify your appointment date, day, time, and location.  At your office visit:  Your progress will be evaluated, pathology will be reviewed, and any additional concerns and instructions will be discussed.    Questions or Concerns  Call your gynecologist's office if you experience the following:  Severe pain not controlled by pain medication  Foul smelling vaginal discharge  Heavy bleeding  Shortness of breath  Fever  Redness, increased swelling or drainage from your incision  Crying and periods of sadness that prevents you from caring for yourself   Burning sensation during urination or inability to urinate  Swelling, redness or abnormal warmth to your leg/calf  Please call [phone]. If your call is made after office hours, a physician will be available to help you.  There is always a provider covering our patients.    Thank you for coming to [facility] for your surgery.  The nurses, gynecologist, and the anesthesiologists try very hard to make sure you receive the best care possible.  Your trust in them as well as us is greatly appreciated.    Thanks so much,   The Providers of [facility]       HOME INSTRUCTIONS  [facility]: [facility]ESTHESIA  The medication  that you received for sedation or general anesthesia can last up to 24 hours. Your judgment and reflexes may be altered, even if you feel like your normal self.      We Recommend:   Do not drive any motor vehicle or bicycle   Avoid mowing the lawn, playing sports, or working with power tools/applicances (power saws, electric knives or mixers)   That you have someone stay with you on your first night home   Do not drink alcohol or take sleeping pills or tranquilizers   Do not sign legal documents within 24 hours of your procedure   If you had a nerve block for your surgery, take extra care not to put any pressure on your arm or hand for 24 hours    It is normal:  For you to have a sore throat if you had a breathing tube during surgery (while you were asleep!). The sore throat should get better within 48 hours. You can gargle with warm salt water (1/2 tsp in 4 oz warm water) or use a throat lozenge for comfort  To feel muscle aches or soreness especially in the abdomen, chest or neck. The achy feeling should go away in the next 24 hours  To feel weak, sleepy or \"wiped out\". Your should start feeling better in the next 24 hours.   To experience mild discomforts such as sore lip or tongue, headache, cramps, gas pains or a bloated feeling in your abdomen.   To experience mild back pain or soreness for a day or two if you had spinal or epidural anesthesia.   If you had laparoscopic surgery, to feel shoulder pain or discomfort on the day of surgery.   For some patients to have nausea after surgery/anesthesia    If you feel nausea or experience vomiting:   Try to move around less.   Eat less than usual or drink only liquids until the next morning   Nausea should resolve in about 24 hours    If you have a problem when you are at home:    Call your surgeons office   Discharge Instructions: After Your Surgery  You’ve just had surgery. During surgery, you were given medicine called anesthesia to keep you relaxed and free of  pain. After surgery, you may have some pain or nausea. This is common. Here are some tips for feeling better and getting well after surgery.   Going home  Your healthcare provider will show you how to take care of yourself when you go home. They'll also answer your questions. Have an adult family member or friend drive you home. For the first 24 hours after your surgery:   Don't drive or use heavy equipment.  Don't make important decisions or sign legal papers.  Take medicines as directed.  Don't drink alcohol.  Have someone stay with you, if needed. They can watch for problems and help keep you safe.  Be sure to go to all follow-up visits with your healthcare provider. And rest after your surgery for as long as your provider tells you to.   Coping with pain  If you have pain after surgery, pain medicine will help you feel better. Take it as directed, before pain becomes severe. Also, ask your healthcare provider or pharmacist about other ways to control pain. This might be with heat, ice, or relaxation. And follow any other instructions your surgeon or nurse gives you.      Stay on schedule with your medicine.     Tips for taking pain medicine  To get the best relief possible, remember these points:   Pain medicines can upset your stomach. Taking them with a little food may help.  Most pain relievers taken by mouth need at least 20 to 30 minutes to start to work.  Don't wait till your pain becomes severe before you take your medicine. Try to time your medicine so that you can take it before starting an activity. This might be before you get dressed, go for a walk, or sit down for dinner.  Constipation is a common side effect of some pain medicines. Call your healthcare provider before taking any medicines such as laxatives or stool softeners to help ease constipation. Also ask if you should skip any foods. Drinking lots of fluids and eating foods such as fruits and vegetables that are high in fiber can also help.  Remember, don't take laxatives unless your surgeon has prescribed them.  Drinking alcohol and taking pain medicine can cause dizziness and slow your breathing. It can even be deadly. Don't drink alcohol while taking pain medicine.  Pain medicine can make you react more slowly to things. Don't drive or run machinery while taking pain medicine.  Your healthcare provider may tell you to take acetaminophen to help ease your pain. Ask them how much you're supposed to take each day. Acetaminophen or other pain relievers may interact with your prescription medicines or other over-the-counter (OTC) medicines. Some prescription medicines have acetaminophen and other ingredients in them. Using both prescription and OTC acetaminophen for pain can cause you to accidentally overdose. Read the labels on your OTC medicines with care. This will help you to clearly know the list of ingredients, how much to take, and any warnings. It may also help you not take too much acetaminophen. If you have questions or don't understand the information, ask your pharmacist or healthcare provider to explain it to you before you take the OTC medicine.   Managing nausea  Some people have an upset stomach (nausea) after surgery. This is often because of anesthesia, pain, or pain medicine, less movement of food in the stomach, or the stress of surgery. These tips will help you handle nausea and eat healthy foods as you get better. If you were on a special food plan before surgery, ask your healthcare provider if you should follow it while you get better. Check with your provider on how your eating should progress. It may depend on the surgery you had. These general tips may help:   Don't push yourself to eat. Your body will tell you when to eat and how much.  Start off with clear liquids and soup. They're easier to digest.  Next try semi-solid foods as you feel ready. These include mashed potatoes, applesauce, and gelatin.  Slowly move to solid  foods. Don’t eat fatty, rich, or spicy foods at first.  Don't force yourself to have 3 large meals a day. Instead eat smaller amounts more often.  Take pain medicines with a small amount of solid food, such as crackers or toast. This helps prevent nausea.  When to call your healthcare provider  Call your healthcare provider right away if you have any of these:   You still have too much pain, or the pain gets worse, after taking the medicine. The medicine may not be strong enough. Or there may be a complication from the surgery.  You feel too sleepy, dizzy, or groggy. The medicine may be too strong.  Side effects such as nausea or vomiting. Your healthcare provider may advise taking other medicines to .  Skin changes such as rash, itching, or hives. This may mean you have an allergic reaction. Your provider may advise taking other medicines.  The incision looks different (for instance, part of it opens up).  Bleeding or fluid leaking from the incision site, and weren't told to expect that.  Fever of 100.4°F (38°C) or higher, or as directed by your provider.  Call 911  Call 911 right away if you have:   Trouble breathing  Facial swelling    If you have obstructive sleep apnea   You were given anesthesia medicine during surgery to keep you comfortable and free of pain. After surgery, you may have more apnea spells because of this medicine and other medicines you were given. The spells may last longer than normal.    At home:  Keep using the continuous positive airway pressure (CPAP) device when you sleep. Unless your healthcare provider tells you not to, use it when you sleep, day or night. CPAP is a common device used to treat obstructive sleep apnea.  Talk with your provider before taking any pain medicine, muscle relaxants, or sedatives. Your provider will tell you about the possible dangers of taking these medicines.  Contact your provider if your sleeping changes a lot even when taking medicines as  joselyn Tierney last reviewed this educational content on 10/1/2021  © 4942-6044 [employer]. All rights reserved. This information is not intended as a substitute for professional medical care. Always follow your healthcare professional's instructions.

## 2024-01-31 ENCOUNTER — HOSPITAL ENCOUNTER (OUTPATIENT)
Facility: HOSPITAL | Age: 44
Setting detail: HOSPITAL OUTPATIENT SURGERY
Discharge: HOME | End: 2024-01-31
Attending: OBSTETRICS & GYNECOLOGY | Admitting: OBSTETRICS & GYNECOLOGY
Payer: COMMERCIAL

## 2024-01-31 ENCOUNTER — ANESTHESIA EVENT (OUTPATIENT)
Dept: SURGERY | Facility: HOSPITAL | Age: 44
End: 2024-01-31
Payer: COMMERCIAL

## 2024-01-31 ENCOUNTER — ANESTHESIA (OUTPATIENT)
Dept: SURGERY | Facility: HOSPITAL | Age: 44
End: 2024-01-31
Payer: COMMERCIAL

## 2024-01-31 VITALS
HEIGHT: 64 IN | DIASTOLIC BLOOD PRESSURE: 59 MMHG | TEMPERATURE: 98 F | OXYGEN SATURATION: 100 % | SYSTOLIC BLOOD PRESSURE: 106 MMHG | HEART RATE: 82 BPM | RESPIRATION RATE: 18 BRPM | WEIGHT: 184.69 LBS | BODY MASS INDEX: 31.53 KG/M2

## 2024-01-31 DIAGNOSIS — D21.9 FIBROID: ICD-10-CM

## 2024-01-31 DIAGNOSIS — N93.9 ABNORMAL UTERINE BLEEDING (AUB): ICD-10-CM

## 2024-01-31 DIAGNOSIS — Z01.818 PRE-OP TESTING: Primary | ICD-10-CM

## 2024-01-31 PROBLEM — D25.1 INTRAMURAL LEIOMYOMA OF UTERUS: Status: ACTIVE | Noted: 2024-01-31

## 2024-01-31 LAB
B-HCG UR QL: NEGATIVE
DEPRECATED RDW RBC AUTO: 48.2 FL (ref 35.1–46.3)
ERYTHROCYTE [DISTWIDTH] IN BLOOD BY AUTOMATED COUNT: 13.9 % (ref 11–15)
HCT VFR BLD AUTO: 36.8 %
HGB BLD-MCNC: 12 G/DL
MCH RBC QN AUTO: 31 PG (ref 26–34)
MCHC RBC AUTO-ENTMCNC: 32.6 G/DL (ref 31–37)
MCV RBC AUTO: 95.1 FL
PLATELET # BLD AUTO: 222 10(3)UL (ref 150–450)
RBC # BLD AUTO: 3.87 X10(6)UL
WBC # BLD AUTO: 18.3 X10(3) UL (ref 4–11)

## 2024-01-31 PROCEDURE — 58571 TLH W/T/O 250 G OR LESS: CPT | Performed by: OBSTETRICS & GYNECOLOGY

## 2024-01-31 PROCEDURE — 0UT74ZZ RESECTION OF BILATERAL FALLOPIAN TUBES, PERCUTANEOUS ENDOSCOPIC APPROACH: ICD-10-PCS | Performed by: OBSTETRICS & GYNECOLOGY

## 2024-01-31 PROCEDURE — 0UT94ZZ RESECTION OF UTERUS, PERCUTANEOUS ENDOSCOPIC APPROACH: ICD-10-PCS | Performed by: OBSTETRICS & GYNECOLOGY

## 2024-01-31 RX ORDER — ONDANSETRON 4 MG/1
4 TABLET, FILM COATED ORAL EVERY 8 HOURS PRN
Status: DISCONTINUED | OUTPATIENT
Start: 2024-01-31 | End: 2024-01-31

## 2024-01-31 RX ORDER — CEFAZOLIN SODIUM/WATER 2 G/20 ML
SYRINGE (ML) INTRAVENOUS AS NEEDED
Status: DISCONTINUED | OUTPATIENT
Start: 2024-01-31 | End: 2024-01-31 | Stop reason: SURG

## 2024-01-31 RX ORDER — SODIUM CHLORIDE, SODIUM LACTATE, POTASSIUM CHLORIDE, CALCIUM CHLORIDE 600; 310; 30; 20 MG/100ML; MG/100ML; MG/100ML; MG/100ML
INJECTION, SOLUTION INTRAVENOUS CONTINUOUS
Status: DISCONTINUED | OUTPATIENT
Start: 2024-01-31 | End: 2024-01-31

## 2024-01-31 RX ORDER — ONDANSETRON 2 MG/ML
INJECTION INTRAMUSCULAR; INTRAVENOUS AS NEEDED
Status: DISCONTINUED | OUTPATIENT
Start: 2024-01-31 | End: 2024-01-31 | Stop reason: SURG

## 2024-01-31 RX ORDER — DEXAMETHASONE SODIUM PHOSPHATE 4 MG/ML
VIAL (ML) INJECTION AS NEEDED
Status: DISCONTINUED | OUTPATIENT
Start: 2024-01-31 | End: 2024-01-31 | Stop reason: SURG

## 2024-01-31 RX ORDER — MORPHINE SULFATE 10 MG/ML
6 INJECTION, SOLUTION INTRAMUSCULAR; INTRAVENOUS EVERY 10 MIN PRN
Status: DISCONTINUED | OUTPATIENT
Start: 2024-01-31 | End: 2024-01-31

## 2024-01-31 RX ORDER — NALOXONE HYDROCHLORIDE 0.4 MG/ML
80 INJECTION, SOLUTION INTRAMUSCULAR; INTRAVENOUS; SUBCUTANEOUS AS NEEDED
Status: DISCONTINUED | OUTPATIENT
Start: 2024-01-31 | End: 2024-01-31

## 2024-01-31 RX ORDER — EPHEDRINE SULFATE 50 MG/ML
INJECTION, SOLUTION INTRAVENOUS AS NEEDED
Status: DISCONTINUED | OUTPATIENT
Start: 2024-01-31 | End: 2024-01-31 | Stop reason: SURG

## 2024-01-31 RX ORDER — MORPHINE SULFATE 4 MG/ML
2 INJECTION, SOLUTION INTRAMUSCULAR; INTRAVENOUS EVERY 10 MIN PRN
Status: DISCONTINUED | OUTPATIENT
Start: 2024-01-31 | End: 2024-01-31

## 2024-01-31 RX ORDER — ONDANSETRON 2 MG/ML
4 INJECTION INTRAMUSCULAR; INTRAVENOUS EVERY 8 HOURS PRN
Status: DISCONTINUED | OUTPATIENT
Start: 2024-01-31 | End: 2024-01-31

## 2024-01-31 RX ORDER — ACETAMINOPHEN 500 MG
1000 TABLET ORAL ONCE
Status: COMPLETED | OUTPATIENT
Start: 2024-01-31 | End: 2024-01-31

## 2024-01-31 RX ORDER — ROCURONIUM BROMIDE 10 MG/ML
INJECTION, SOLUTION INTRAVENOUS AS NEEDED
Status: DISCONTINUED | OUTPATIENT
Start: 2024-01-31 | End: 2024-01-31 | Stop reason: SURG

## 2024-01-31 RX ORDER — LIDOCAINE HYDROCHLORIDE 10 MG/ML
INJECTION, SOLUTION EPIDURAL; INFILTRATION; INTRACAUDAL; PERINEURAL AS NEEDED
Status: DISCONTINUED | OUTPATIENT
Start: 2024-01-31 | End: 2024-01-31 | Stop reason: SURG

## 2024-01-31 RX ORDER — FAMOTIDINE 20 MG/1
20 TABLET, FILM COATED ORAL 2 TIMES DAILY
Status: DISCONTINUED | OUTPATIENT
Start: 2024-01-31 | End: 2024-01-31

## 2024-01-31 RX ORDER — HYDROMORPHONE HYDROCHLORIDE 1 MG/ML
0.2 INJECTION, SOLUTION INTRAMUSCULAR; INTRAVENOUS; SUBCUTANEOUS EVERY 5 MIN PRN
Status: DISCONTINUED | OUTPATIENT
Start: 2024-01-31 | End: 2024-01-31

## 2024-01-31 RX ORDER — BUPIVACAINE HYDROCHLORIDE 2.5 MG/ML
INJECTION, SOLUTION EPIDURAL; INFILTRATION; INTRACAUDAL AS NEEDED
Status: DISCONTINUED | OUTPATIENT
Start: 2024-01-31 | End: 2024-01-31 | Stop reason: HOSPADM

## 2024-01-31 RX ORDER — METOCLOPRAMIDE 10 MG/1
10 TABLET ORAL
Status: DISCONTINUED | OUTPATIENT
Start: 2024-01-31 | End: 2024-01-31

## 2024-01-31 RX ORDER — MORPHINE SULFATE 4 MG/ML
4 INJECTION, SOLUTION INTRAMUSCULAR; INTRAVENOUS EVERY 10 MIN PRN
Status: DISCONTINUED | OUTPATIENT
Start: 2024-01-31 | End: 2024-01-31

## 2024-01-31 RX ORDER — GABAPENTIN 300 MG/1
300 CAPSULE ORAL ONCE AS NEEDED
Status: DISCONTINUED | OUTPATIENT
Start: 2024-01-31 | End: 2024-01-31

## 2024-01-31 RX ORDER — HYDROMORPHONE HYDROCHLORIDE 1 MG/ML
0.4 INJECTION, SOLUTION INTRAMUSCULAR; INTRAVENOUS; SUBCUTANEOUS EVERY 5 MIN PRN
Status: DISCONTINUED | OUTPATIENT
Start: 2024-01-31 | End: 2024-01-31

## 2024-01-31 RX ORDER — IBUPROFEN 600 MG/1
600 TABLET ORAL ONCE
Status: COMPLETED | OUTPATIENT
Start: 2024-01-31 | End: 2024-01-31

## 2024-01-31 RX ORDER — HYDROMORPHONE HYDROCHLORIDE 1 MG/ML
0.6 INJECTION, SOLUTION INTRAMUSCULAR; INTRAVENOUS; SUBCUTANEOUS EVERY 5 MIN PRN
Status: DISCONTINUED | OUTPATIENT
Start: 2024-01-31 | End: 2024-01-31

## 2024-01-31 RX ADMIN — LIDOCAINE HYDROCHLORIDE 50 MG: 10 INJECTION, SOLUTION EPIDURAL; INFILTRATION; INTRACAUDAL; PERINEURAL at 10:37:00

## 2024-01-31 RX ADMIN — SODIUM CHLORIDE, SODIUM LACTATE, POTASSIUM CHLORIDE, CALCIUM CHLORIDE: 600; 310; 30; 20 INJECTION, SOLUTION INTRAVENOUS at 12:37:00

## 2024-01-31 RX ADMIN — ROCURONIUM BROMIDE 5 MG: 10 INJECTION, SOLUTION INTRAVENOUS at 12:08:00

## 2024-01-31 RX ADMIN — DEXAMETHASONE SODIUM PHOSPHATE 8 MG: 4 MG/ML VIAL (ML) INJECTION at 10:38:00

## 2024-01-31 RX ADMIN — SODIUM CHLORIDE, SODIUM LACTATE, POTASSIUM CHLORIDE, CALCIUM CHLORIDE: 600; 310; 30; 20 INJECTION, SOLUTION INTRAVENOUS at 12:16:00

## 2024-01-31 RX ADMIN — EPHEDRINE SULFATE 5 MG: 50 INJECTION, SOLUTION INTRAVENOUS at 12:07:00

## 2024-01-31 RX ADMIN — SODIUM CHLORIDE, SODIUM LACTATE, POTASSIUM CHLORIDE, CALCIUM CHLORIDE: 600; 310; 30; 20 INJECTION, SOLUTION INTRAVENOUS at 10:31:00

## 2024-01-31 RX ADMIN — CEFAZOLIN SODIUM/WATER 2 G: 2 G/20 ML SYRINGE (ML) INTRAVENOUS at 10:45:00

## 2024-01-31 RX ADMIN — ROCURONIUM BROMIDE 50 MG: 10 INJECTION, SOLUTION INTRAVENOUS at 10:37:00

## 2024-01-31 RX ADMIN — ONDANSETRON 4 MG: 2 INJECTION INTRAMUSCULAR; INTRAVENOUS at 10:38:00

## 2024-01-31 RX ADMIN — SODIUM CHLORIDE, SODIUM LACTATE, POTASSIUM CHLORIDE, CALCIUM CHLORIDE: 600; 310; 30; 20 INJECTION, SOLUTION INTRAVENOUS at 11:54:00

## 2024-01-31 NOTE — H&P
GYN Pre-op History and Physical  [facility]    CHIEF COMPLAINT: Scheduled surgery   HISTORY OF PRESENT ILLNESS:   Sarah Sawyer is a 43 year old female   who presents for scheduled total laparoscopic hysterectomy with bilateral salpingectomy and cystoscopy.    No complaints today. No changes since our last discussion.      PAST MEDICAL HISTORY:   Past Medical History:   Diagnosis Date    Anemia     YRS AGO    Asthma     BV (bacterial vaginosis)     Dysmenorrhea     Dyspareunia     Esophageal reflux     Fibroids     Heartburn     IBS (irritable bowel syndrome)     Menstrual suppression 2021    Migraines     Pneumonia due to organism     years ago    Submucous uterine fibroid 2023        PAST SURGICAL HISTORY:   Past Surgical History:   Procedure Laterality Date    Colposcopy, cervix w/upper adjacent vagina; w/biopsy(s), cervix      Hysteroscopy  2023    Hysteroscopy with myomectomy, resection of a submucosal fibroid with MyoSure and an endometrial ablation with NovaSure.    Leep  2013        PAST OB HISTORY:  OB History    Para Term  AB Living   2 2 1 1   2   SAB IAB Ectopic Multiple Live Births           2      # Outcome Date GA Lbr Cuco/2nd Weight Sex Delivery Anes PTL Lv   2  01 36w0d   F NORMAL SPONT  Y NICOLE      Complications: Abruptio Placenta   1 Term 98 39w5d   M NORMAL SPONT  N NICOLE       CURRENT MEDICATIONS:    No current outpatient medications on file.    ALLERGIES:  No Known Allergies    SOCIAL HISTORY:  Social History     Socioeconomic History    Marital status:    Occupational History    Occupation:     Occupation:    Tobacco Use    Smoking status: Never    Smokeless tobacco: Never   Vaping Use    Vaping Use: Never used   Substance and Sexual Activity    Alcohol use: Yes     Comment: socially    Drug use: No    Sexual activity: Yes     Partners: Male   Other Topics Concern    Caffeine Concern No    Exercise  No    Seat Belt No    Special Diet No    Stress Concern No    Weight Concern Yes     Comment: I think im too fat    Blood Transfusions No         FAMILY HISTORY:  Family History   Problem Relation Age of Onset    Hypertension Father     Other (Covid) Father         Pneumonia     Hypertension Mother     Diabetes Maternal Grandfather     Diabetes Maternal Uncle     Breast Cancer Neg     Ovarian Cancer Neg     Colon Cancer Neg      ASSESSMENTS:  PHYSICAL EXAM:   Patient's last menstrual period was 01/07/2024 (approximate).   Vitals:    01/27/24 0841 01/31/24 0835   BP:  112/74   BP Location:  Right arm   Pulse:  86   Resp:  18   Temp:  97.6 °F (36.4 °C)   TempSrc:  Oral   SpO2:  98%   Weight: 170 lb (77.1 kg) 184 lb 11.2 oz (83.8 kg)   Height: 64\" 64\"     CONSTITUTIONAL: Awake, alert, cooperative, no apparent distress, and appears stated age   NECK: Supple, symmetrical, trachea midline, no adenopathy, thyroid symmetric, not enlarged and no tenderness  LUNGS: No excess work of breathing, LCTAB  CV: RRR no murmurs  ABDOMEN: Soft, non-distended, non-tender, no masses palpated    GENITAL/URINARY:    Uterus nontender.    MUSCULOSKELETAL: There is no redness, warmth, or swelling of the joints. Tone is normal.  NEUROLOGIC: Patient is awake, alert and oriented to name, place and time. Casual gait is normal.  SKIN: no bruising or bleeding and no rashes  PSYCHIATRIC: Behavior:  Appropriate  Mood:  appropriate    DATA:   Us Pelvis 11/8/23  FINDINGS:  UTERUS:   Measures 8.7 x 4.5 x 5.4 cm     ENDOMETRIUM: Endometrial thickness 4.3 mm.  Nabothian cysts within the cervix  MYOMETRIUM: Small posterior heterogeneous uterine leiomyoma measures 11 x 10 x 10 mm.  Heterogeneous uterine myometrium .     OVARIES AND ADNEXA:     RIGHT:   Measures 3.6 x 2.4 x 2.4 cm. Small subcentimeter physiologic follicles.  LEFT:   Measures 2.7 x 1.3 x 2.3 cm.  Small subcentimeter follicle     CUL-DE-SAC:   Normal.  No free fluid or mass.    OTHER:  Negative.  Bladder appears normal.        Impression   CONCLUSION:  1. Small posterior uterine leiomyoma measuring 11 x 10 x 10 mm.  2. Normal thickness endometrium.  Small nabothian cysts in the cervix.  3. Normal bilateral ovaries with simple subcentimeter follicles.     ASSESSMENT AND PLAN:  43 year old  here for scheduled total laparoscopic hysterectomy with b/l salpingectomy and cystoscopy  - risks of surgery reviewed including pain, bleeding, infection, injury to surrounding structures (bowel, bladder, ureters, etc with possible need for a second surgery), possible need for laparotomy, all questions answered, consent form signed and in chart  - proceed to OR when ready.     Plan for DC home once stable post-op. from PACU  post-op. Already has pain medication at home previously prescribed.    Kierra Fitzpatrick, DO

## 2024-01-31 NOTE — ANESTHESIA PREPROCEDURE EVALUATION
Anesthesia PreOp Note    HPI:     Sarah Sawyer is a 43 year old female who presents for preoperative consultation requested by: Kierra Fitzpatrick DO    Date of Surgery: 1/31/2024    Procedure(s):  Total laparoscopic hysterectomy with bilateral salpingectomy and cystoscopy  LAPAROSCOPIC SALPINGECTOMY  CYSTOSCOPY  Indication: Abnormal uterine bleeding (AUB) [N93.9]  Fibroid [D21.9]    Relevant Problems   No relevant active problems       NPO:  Last Liquid Consumption Date: 01/30/24  Last Liquid Consumption Time: 2100  Last Solid Consumption Date: 01/30/24  Last Solid Consumption Time: 2100  Last Liquid Consumption Date: 01/30/24          History Review:  Patient Active Problem List    Diagnosis Date Noted    Intramural uterine fibroid 11/13/2023    Status post hysteroscopy 05/03/2023    Excessive or frequent menstruation 01/31/2023    Irregular periods 01/19/2023    Pelvic pain 01/19/2023    Current moderate episode of major depressive disorder without prior episode (HCC) 07/06/2022    Migraine without aura and without status migrainosus, not intractable 01/28/2020    History of herpes genitalis 09/11/2018    Chronic seasonal allergic rhinitis 11/29/2017    Mild persistent asthma without complication 11/28/2017    History of Helicobacter pylori infection 09/14/2017       Past Medical History:   Diagnosis Date    Anemia     YRS AGO    Asthma     BV (bacterial vaginosis)     Dysmenorrhea     Dyspareunia     Esophageal reflux     Fibroids     Heartburn     IBS (irritable bowel syndrome)     Menstrual suppression 03/11/2021    Migraines     Pneumonia due to organism     years ago    Submucous uterine fibroid 01/31/2023       Past Surgical History:   Procedure Laterality Date    COLPOSCOPY, CERVIX W/UPPER ADJACENT VAGINA; W/BIOPSY(S), CERVIX      HYSTEROSCOPY  04/20/2023    Hysteroscopy with myomectomy, resection of a submucosal fibroid with MyoSure and an endometrial ablation with NovaSure.    LEEP  06/2013        Medications Prior to Admission   Medication Sig Dispense Refill Last Dose    gabapentin 300 MG Oral Cap Take 1 capsule (300 mg total) by mouth every 8 (eight) hours as needed. 30 capsule 0 Unknown    ibuprofen 600 MG Oral Tab Take 1 tablet (600 mg total) by mouth every 6 (six) hours as needed for Pain. 60 tablet 0 Unknown    mometasone furoate 50 MCG/ACT Nasal Suspension 2 sprays by Nasal route daily. 3 each 1 1/27/2024    VALACYCLOVIR 1 G Oral Tab TAKE 1 TABLET(1000 MG) BY MOUTH DAILY 90 tablet 2 Unknown    FLOVENT HFA 44 MCG/ACT Inhalation Aerosol Inhale 1 puff into the lungs 2 (two) times daily.   Unknown    Albuterol Sulfate  (90 Base) MCG/ACT Inhalation Aero Soln Inhale 2 puffs into the lungs every 6 (six) hours as needed for Wheezing or Shortness of Breath. 1 Inhaler 1 Unknown    BORIC ACID 600MG V-CAP Insert 1 capsule VAGINALLY nightly for 7-14 nights then 2-3 times weekly as needed. 30 capsule 0 Unknown     Current Facility-Administered Medications Ordered in Epic   Medication Dose Route Frequency Provider Last Rate Last Admin    lactated ringers infusion   Intravenous Continuous Kierra Fitzpatrick T, DO 20 mL/hr at 01/31/24 0857 New Bag at 01/31/24 0857    famotidine (Pepcid) tab 20 mg  20 mg Oral BID Kierra Fitzpatrick, DO   20 mg at 01/31/24 0852    metoclopramide (Reglan) tab 10 mg  10 mg Oral TID AC Kierra Fitzpatrick T, DO   10 mg at 01/31/24 0852     No current [facility]-ordered outpatient medications on file.       No Known Allergies    Family History   Problem Relation Age of Onset    Hypertension Father     Other (Covid) Father         Pneumonia     Hypertension Mother     Diabetes Maternal Grandfather     Diabetes Maternal Uncle     Breast Cancer Neg     Ovarian Cancer Neg     Colon Cancer Neg      Social History     Socioeconomic History    Marital status:    Occupational History    Occupation:     Occupation:    Tobacco Use    Smoking status: Never    Smokeless tobacco:  Never   Vaping Use    Vaping Use: Never used   Substance and Sexual Activity    Alcohol use: Yes     Comment: socially    Drug use: No    Sexual activity: Yes     Partners: Male   Other Topics Concern    Caffeine Concern No    Exercise No    Seat Belt No    Special Diet No    Stress Concern No    Weight Concern Yes     Comment: I think im too fat    Blood Transfusions No       Available pre-op labs reviewed.  Lab Results   Component Value Date    WBC 6.5 01/29/2024    RBC 4.15 01/29/2024    HGB 13.0 01/29/2024    HCT 38.9 01/29/2024    MCV 93.7 01/29/2024    MCH 31.3 01/29/2024    MCHC 33.4 01/29/2024    RDW 13.9 01/29/2024    .0 01/29/2024    URINEPREG Negative 01/31/2024             Vital Signs:  Body mass index is 31.7 kg/m².   height is 1.626 m (5' 4\") and weight is 83.8 kg (184 lb 11.2 oz). Her oral temperature is 97.6 °F (36.4 °C). Her blood pressure is 112/74 and her pulse is 86. Her respiration is 18 and oxygen saturation is 98%.   Vitals:    01/27/24 0841 01/31/24 0835   BP:  112/74   Pulse:  86   Resp:  18   Temp:  97.6 °F (36.4 °C)   TempSrc:  Oral   SpO2:  98%   Weight: 77.1 kg (170 lb) 83.8 kg (184 lb 11.2 oz)   Height: 1.626 m (5' 4\") 1.626 m (5' 4\")        Anesthesia Evaluation     Patient summary reviewed and Nursing notes reviewed    Airway   Mallampati: II  Dental - Dentition appears grossly intact     Pulmonary     breath sounds clear to auscultation  (+) asthma  Cardiovascular - negative ROS  Exercise tolerance: good    Rhythm: regular  Rate: normal    Neuro/Psych - negative ROS     GI/Hepatic/Renal    (+) GERD    Endo/Other - negative ROS   Abdominal                  Anesthesia Plan:   ASA:  2  Plan:   General  Airway:  ETT and Video laryngoscope  Post-op Pain Management: IV analgesics      I have informed Sarah Sawyer and/or legal guardian or family member of the nature of the anesthetic plan, benefits, risks including possible dental damage if relevant, major complications, and any  alternative forms of anesthetic management.   All of the patient's questions were answered to the best of my ability. The patient desires the anesthetic management as planned.  TASNEEM DERAS MD  1/31/2024 10:09 AM  Present on Admission:  **None**

## 2024-01-31 NOTE — OPERATIVE REPORT
Operative Report     DATE OF PROCEDURE: 1/31/24    PRE-OP DIAGNOSIS: abnormal uterine bleeding, fibroid uterus    POST-OP DIAGNOSIS: SAME     PROCEDURE(S):    Total laparoscopic hysterectomy, bilateral salpingectomy, and cystoscopy.    ANESTHESIA: General     SURGEON(S): Kierra Fitzpatrick DO     ASSISTANTS: Zay Baxter MD    ESTIMATED BLOOD LOSS: 750 mL     IV FLUIDS: 1000 mL    URINARY OUTPUT: 600 mL     SPECIMENS: uterus, cervix, bilateral fallopian tubes    IMPLANTS: Surgicel powder     COMPLICATIONS: None     Findings: On bimanual exam, normal sized uterus, anteverted; multiparous appearing cervix. On laparoscopy, normal appearing uterus and bilateral tubes and ovaries.     On cystoscopy, no visible suture or other injury. Efflux of urine noted from bilateral ureteral orifices.    At end of surgery, excellent hemostasis noted at vaginal cuff and bilateral pedicles.    Procedure Details:   After informed consent was obtained, the patient was then taken back to the operating room. She was then placed in the supine position and general anesthesia was initiated with endotracheal intubation. The patient's arms were tucked by her side with protective soft padding placed around the joints. A padding sheet was then used to secure each arm with careful measures to decrease the risk the patient sustaining any compression injury. The patient was then placed in the dorsal lithotomy position . The abdomen and vagina were then prepped and draped in a normal sterile fashion.      A sterile speculum was placed in the patient's vagina and visualization of the cervix was obtained with findings noted above. An 0-PDS suture was placed on the anterior lip of the cervix. The uterus was sounded to 9 cm. A large size V-care was then advanced through the cervical canal and secured. The 0-PDS suture was then tied to the V-care cup. The V-care was noted to be secured and in the proper place. The single tooth tenaculum was removed. The  urinary minaya catheter was then inserted and secured in place to gravity.      Attention was then turned to the abdomen. After infiltration with 0.25% marcaine, a 5 mm horizontal incision was made with scalpel superior to the umbilicus. The abdomen was grasped and elevated. A Veress needle was then advanced through the incision until 2 pop sensations noted. A syringe with sterile water was then attached to the Veress needle, used to aspirate and then inject sterile water with confirmation of proper placement of the Veress needle. The Co2 gas tubing was then attached to the Veress needle and the opening pressure was noted to be 7 mmHg. The abdomen was insufflated with Co2 gas until 15 mmHg. A 5 mm optiview trocar was then advanced into the abdomen with the laparoscope under direct visualization. The abdomen was then insufflated with CO2 gas to a pressure of 15 mmHg and pneumoperitoneum was obtained.      A second and third 5 mm horizontal incisions were then made in the bilateral lower quadrants after infiltration with marcaine. The trocars were advanced under direct visualization. The patient was placed into trendelenburg position. A survey of the pelvis was performed with findings noted above.     The left fallopian tube was elevated and was transected along the mesosalpinx using ligasure device. It was completely transected at the cornua and removed through a trocar to be sent with the specimen at the end of surgery. The round ligament was then ligated. The utero-ovarian ligament was also ligated. The anterior leaf of the broad ligament was dissected and extended to created a bladder flap. The same steps were repeated on the right side. Ligation and dissection of the anterior and posterior leaves of the broad ligament were performed bilaterally. The bladder flap was created and the V care cervical cup was palpated.  The uterine arteries were identified and ligation of the uterine arteries were performed  bilaterally with good hemostasis. The colpotomy was performed using electrocautery device. Completion of the colpotomy was noted. The uterus with cervix with bilateral fallopian tubes were removed from the pelvis through the vagina. The specimen was collected to be sent to pathology. Significant bleeding was noted at the vaginal cuff during delivery of the uterus.     The vaginal cuff was closed with 0 suture in a horizontal running fashion. Palpation revealed no defects. Good hemostasis noted. The urinary minaya catheter was removed. A cystoscopy was performed without complications. No lesions noted along the bladder walls. Bilateral ureteral efflux was noted.     Attention was then returned to the abdomen. The left adnexa pedicle was bleeding. This was controlled using the ligasure device with excellent hemostasis noted. The vaginal cuff was difficult to see due to urine in the bladder. A straight catheter was used to drain approximately 50 mL of cystoscopy fluid from the bladder. Excellent visualization of the cuff was achieved. Surgicel powder was placed along the cuff and the bilateral pedicles. The surgical beds were noted to be hemostatic. The insufflation was released.  Each trocar was removed without complications. Each incision site was closed with 4-0 Monocryl subcutaneous running sutures. The abdomen was cleaned.  Skin glue was placed at each site.        DISPOSITION: awakened from anesthesia, extubated and taken to the recovery room in a stable condition.    Kierra Fitzpatrick DO

## 2024-01-31 NOTE — ANESTHESIA PROCEDURE NOTES
Airway  Date/Time: 1/31/2024 10:39 AM  Urgency: Elective      General Information and Staff    Patient location during procedure: OR  Anesthesiologist: Sung Sun MD  Performed: anesthesiologist   Performed by: Sung Sun MD  Authorized by: Sung Sun MD      Indications and Patient Condition  Indications for airway management: anesthesia  Sedation level: deep  Preoxygenated: yes  Patient position: sniffing  Mask difficulty assessment: 2 - vent by mask + OA or adjuvant +/- NMBA    Final Airway Details  Final airway type: endotracheal airway      Successful airway: ETT  Cuffed: yes   Successful intubation technique: direct laryngoscopy  Endotracheal tube insertion site: oral  Blade type: Donews.  Blade size: #3  ETT size (mm): 7.0    Placement verified by: capnometry   Measured from: teeth  Number of attempts at approach: 1  Number of other approaches attempted: 0    Additional Comments  Difficult hair placement

## 2024-01-31 NOTE — ANESTHESIA POSTPROCEDURE EVALUATION
Patient: Sarah Sawyer    Procedure Summary       Date: 01/31/24 Room / Location: [facility] MAIN OR 03 / [facility] MAIN OR    Anesthesia Start: 1030 Anesthesia Stop:     Procedures:       Total laparoscopic hysterectomy with bilateral salpingectomy and cystoscopy (Bilateral: Uterus)      LAPAROSCOPIC SALPINGECTOMY (Bilateral: Abdomen)      CYSTOSCOPY (Bilateral: Urethra) Diagnosis:       Abnormal uterine bleeding (AUB)      Fibroid      (Abnormal uterine bleeding (AUB) [N93.9]Fibroid [D21.9])    Surgeons: Kierra Fitzpatrick DO Anesthesiologist: Tasneem Deras MD    Anesthesia Type: general ASA Status: 2            Anesthesia Type: general    Vitals Value Taken Time   /102 01/31/24 1240   Temp 97.8 °F (36.6 °C) 01/31/24 1240   Pulse 97 01/31/24 1240   Resp 15 01/31/24 1240   SpO2 100 % 01/31/24 1240       EM AN Post Evaluation:   Patient Evaluated in PACU  Patient Participation: complete - patient participated  Level of Consciousness: sleepy but conscious  Pain Score: 1  Pain Management: adequate  Airway Patency:patent  Dental exam unchanged from preop  Yes    Cardiovascular Status: acceptable  Respiratory Status: acceptable  Postoperative Hydration acceptable      TASNEEM DERAS MD  1/31/2024 12:40 PM

## 2024-02-12 ENCOUNTER — TELEPHONE (OUTPATIENT)
Dept: OBGYN CLINIC | Facility: CLINIC | Age: 44
End: 2024-02-12

## 2024-02-12 NOTE — TELEPHONE ENCOUNTER
Incoming call from patient to inform she completed a Laparoscopic Hysterectomy on 01/31/2024 and has notice a brown discharge with odor and its been  one week and is getting worst .  Patient will like to know if this is normal and a call back form RN.    Please assist

## 2024-02-12 NOTE — TELEPHONE ENCOUNTER
Called pt c/o foul vaginal odor, fishy, recently had surgery.  Scheduled with LC for Wednesday due to RD's schedule being full.  Also informed pt to keep post-op visit with RD.  Pt agrees.

## 2024-02-14 ENCOUNTER — OFFICE VISIT (OUTPATIENT)
Dept: OBGYN CLINIC | Facility: CLINIC | Age: 44
End: 2024-02-14
Payer: COMMERCIAL

## 2024-02-14 VITALS
BODY MASS INDEX: 31.41 KG/M2 | DIASTOLIC BLOOD PRESSURE: 72 MMHG | HEIGHT: 64 IN | SYSTOLIC BLOOD PRESSURE: 114 MMHG | WEIGHT: 184 LBS

## 2024-02-14 DIAGNOSIS — N76.0 VAGINITIS AND VULVOVAGINITIS: Primary | ICD-10-CM

## 2024-02-14 PROCEDURE — 99213 OFFICE O/P EST LOW 20 MIN: CPT | Performed by: OBSTETRICS & GYNECOLOGY

## 2024-02-14 PROCEDURE — 81514 NFCT DS BV&VAGINITIS DNA ALG: CPT | Performed by: OBSTETRICS & GYNECOLOGY

## 2024-02-14 RX ORDER — VALACYCLOVIR HYDROCHLORIDE 1 G/1
1000 TABLET, FILM COATED ORAL DAILY
Qty: 90 TABLET | Refills: 2 | Status: SHIPPED | OUTPATIENT
Start: 2024-02-14

## 2024-02-14 RX ORDER — METRONIDAZOLE 500 MG/1
500 TABLET ORAL 2 TIMES DAILY
Qty: 14 TABLET | Refills: 0 | Status: SHIPPED | OUTPATIENT
Start: 2024-02-14 | End: 2024-02-21

## 2024-02-14 NOTE — PROGRESS NOTES
CC: Patient is here for vaginal discharge after surgery    HPI: Patient is a 43 year old  s/p  laparoscopic hys with Dr. Fitzpatrick.  She had a \"gush of browninsh vaginal discharge\" starting last week, with odor. Discharge has decreased in amt. No itching.     Plans FU with Dr. Fitzpatrick 1 W.     No fever, chills, nausea or vomiting. No sex since surgery.     No problems with urination.     Patient's last menstrual period was 2024 (approximate).    OB History    Para Term  AB Living   2 2 1 1   2   SAB IAB Ectopic Multiple Live Births           2      # Outcome Date GA Lbr Cuco/2nd Weight Sex Delivery Anes PTL Lv   2  01 36w0d   F NORMAL SPONT  Y NICOLE      Complications: Abruptio Placenta   1 Term 98 39w5d   M NORMAL SPONT  N NICOLE       GYN hx:         Past Medical History:   Diagnosis Date    Anemia     YRS AGO    Asthma     BV (bacterial vaginosis)     Dysmenorrhea     Dyspareunia     Esophageal reflux     Fibroids     Heartburn     IBS (irritable bowel syndrome)     Menstrual suppression 2021    Migraines     Pneumonia due to organism     years ago    Submucous uterine fibroid 2023     Past Surgical History:   Procedure Laterality Date    COLPOSCOPY, CERVIX W/UPPER ADJACENT VAGINA; W/BIOPSY(S), CERVIX      HYSTERECTOMY  2024    laparoscopic hys with bilateral salpingectomy    HYSTEROSCOPY  2023    Hysteroscopy with myomectomy, resection of a submucosal fibroid with MyoSure and an endometrial ablation with NovaSure.    LEEP  2013     No Known Allergies  Family History   Problem Relation Age of Onset    Hypertension Father     Other (Covid) Father         Pneumonia     Hypertension Mother     Diabetes Maternal Grandfather     Diabetes Maternal Uncle     Breast Cancer Neg     Ovarian Cancer Neg     Colon Cancer Neg      Social History     Socioeconomic History    Marital status:      Spouse name: Not on file    Number of children: Not on file     Years of education: Not on file    Highest education level: Not on file   Occupational History    Occupation:     Occupation:    Tobacco Use    Smoking status: Never    Smokeless tobacco: Never   Vaping Use    Vaping Use: Never used   Substance and Sexual Activity    Alcohol use: Yes     Comment: socially    Drug use: No    Sexual activity: Yes     Partners: Male   Other Topics Concern    Caffeine Concern No    Exercise No    Seat Belt No    Special Diet No    Stress Concern No    Weight Concern Yes     Comment: I think im too fat     Service Not Asked    Blood Transfusions No    Occupational Exposure Not Asked    Hobby Hazards Not Asked    Sleep Concern Not Asked    Back Care Not Asked    Bike Helmet Not Asked    Self-Exams Not Asked   Social History Narrative    Lives with partner and daughter    Son away at school    Works 9pm-5am for [employer]     Social Determinants of Health     Financial Resource Strain: Not on file   Food Insecurity: Not on file   Transportation Needs: Not on file   Physical Activity: Not on file   Stress: Not on file   Social Connections: Not on file   Housing Stability: Not on file       Medications reviewed. See active list.     /72   Ht 64\"   Wt 184 lb (83.5 kg)   LMP 01/07/2024 (Approximate)   BMI 31.58 kg/m²       Exam:   GENERAL: well developed, well nourished, in no apparent distress  ABDOMEN: Soft, non distended; non tender, no masses.  Liver and spleen non-tender, no enlargement. No palpable hernias  GYNE/:  External Genitalia: Normal appearing, no lesions, normal hair distribution   Urethral meatus appear wnl, no abnormal discharge or lesions noted.   Bladder: well supported, urethra wnl, no palpable tenderness or masses, no discharge  Vagina: normal pink mucosa, + clear discharge with odor.   Bimanual: no tenderness or masses.       A/P: Patient is 43 year old female     1. Vaginitis and vulvovaginitis  - valACYclovir 1 G Oral  Tab; Take 1 tablet (1,000 mg total) by mouth daily.  Dispense: 90 tablet; Refill: 2  - Vaginitis Vaginosis PCR Panel; Future  - Chlamydia/Gc Amplification; Future  - metRONIDAZOLE (FLAGYL) 500 MG Oral Tab; Take 1 tablet (500 mg total) by mouth 2 (two) times daily for 7 days.  Dispense: 14 tablet; Refill: 0    Vaginal swab sent.   Recommend FU with Dr. Fitzpatrick 1 RODRIGUE Gavin MD

## 2024-02-21 ENCOUNTER — OFFICE VISIT (OUTPATIENT)
Dept: OBGYN CLINIC | Facility: CLINIC | Age: 44
End: 2024-02-21
Payer: COMMERCIAL

## 2024-02-21 VITALS
HEIGHT: 64 IN | DIASTOLIC BLOOD PRESSURE: 68 MMHG | BODY MASS INDEX: 31.41 KG/M2 | SYSTOLIC BLOOD PRESSURE: 110 MMHG | WEIGHT: 184 LBS

## 2024-02-21 DIAGNOSIS — N89.8 VAGINAL ODOR: ICD-10-CM

## 2024-02-21 DIAGNOSIS — Z48.89 ENCOUNTER FOR POST SURGICAL WOUND CHECK: Primary | ICD-10-CM

## 2024-02-21 DIAGNOSIS — N89.8 VAGINAL DISCHARGE: ICD-10-CM

## 2024-02-21 PROCEDURE — 99024 POSTOP FOLLOW-UP VISIT: CPT | Performed by: OBSTETRICS & GYNECOLOGY

## 2024-02-21 NOTE — PROGRESS NOTES
RETURN GYN OFFICE VISIT  [facility]    CHIEF COMPLAINT:    Chief Complaint   Patient presents with    Post-Op     2 wk f/u; lap Hysterectomy     Vaginal Problem     Pt states she's having vaginal odor and discharge, states she went to get tested nothing came up positive and did not finish antibiotics that she was given.        HISTORY OF PRESENT ILLNESS:    LUCIA is a 43 year old female  here for incision check s/p TLH with bilateral salpingectomy and cystoscopy on 24.  Doing well but still having discharge  Blood-tinged, foul odor.    Swap collected last week was negative so stopped the antiobitics she was given.    Is wearing panty-liner with 3-4 changes per day    No pain    No problems peeing, no problems pooping.      REVIEW OF SYSTEMS:   CONSTITUTIONAL:  negative for fevers, chills, sweats and fatigue  GASTROINTESTINAL:  negative for nausea, vomiting, blood in stool, constipation, diarrhea and abdominal pain  GENITOURINARY: negative for no dysuria, urgency or frequency; no urinary incontinence; no hematuria  SKIN:  negative for  rash, skin lesion and pruritus  ENDOCRINE:  negative for acne, fatigue, weight gain and weight loss  BEHAVIOR/PSYCH:  negative for depressed mood, anhedonia and anxiety    CURRENT MEDICATIONS:      Current Outpatient Medications:     valACYclovir 1 G Oral Tab, Take 1 tablet (1,000 mg total) by mouth daily., Disp: 90 tablet, Rfl: 2    metRONIDAZOLE (FLAGYL) 500 MG Oral Tab, Take 1 tablet (500 mg total) by mouth 2 (two) times daily for 7 days., Disp: 14 tablet, Rfl: 0    gabapentin 300 MG Oral Cap, Take 1 capsule (300 mg total) by mouth every 8 (eight) hours as needed., Disp: 30 capsule, Rfl: 0    ibuprofen 600 MG Oral Tab, Take 1 tablet (600 mg total) by mouth every 6 (six) hours as needed for Pain., Disp: 60 tablet, Rfl: 0    mometasone furoate 50 MCG/ACT Nasal Suspension, 2 sprays by Nasal route daily., Disp: 3 each, Rfl: 1    FLOVENT HFA 44 MCG/ACT Inhalation  Aerosol, Inhale 1 puff into the lungs 2 (two) times daily., Disp: , Rfl:     Albuterol Sulfate  (90 Base) MCG/ACT Inhalation Aero Soln, Inhale 2 puffs into the lungs every 6 (six) hours as needed for Wheezing or Shortness of Breath., Disp: 1 Inhaler, Rfl: 1    BORIC ACID 600MG V-CAP, Insert 1 capsule VAGINALLY nightly for 7-14 nights then 2-3 times weekly as needed., Disp: 30 capsule, Rfl: 0    PAST MEDICAL, SOCIAL AND FAMILY HISTORY:    Past Medical History:   Diagnosis Date    Anemia     YRS AGO    Asthma (HCC)     BV (bacterial vaginosis)     Dysmenorrhea     Dyspareunia     Esophageal reflux     Fibroids     Heartburn     IBS (irritable bowel syndrome)     Menstrual suppression 03/11/2021    Migraines     Pneumonia due to organism     years ago    Submucous uterine fibroid 01/31/2023     Past Surgical History:   Procedure Laterality Date    COLPOSCOPY, CERVIX W/UPPER ADJACENT VAGINA; W/BIOPSY(S), CERVIX      HYSTERECTOMY  01/31/2024    laparoscopic hys with bilateral salpingectomy    HYSTEROSCOPY  04/20/2023    Hysteroscopy with myomectomy, resection of a submucosal fibroid with MyoSure and an endometrial ablation with NovaSure.    LEEP  06/2013     Family History   Problem Relation Age of Onset    Hypertension Father     Other (Covid) Father         Pneumonia     Hypertension Mother     Diabetes Maternal Grandfather     Diabetes Maternal Uncle     Breast Cancer Neg     Ovarian Cancer Neg     Colon Cancer Neg      Social History     Socioeconomic History    Marital status:    Occupational History    Occupation:     Occupation:    Tobacco Use    Smoking status: Never    Smokeless tobacco: Never   Vaping Use    Vaping Use: Never used   Substance and Sexual Activity    Alcohol use: Yes     Comment: socially    Drug use: No    Sexual activity: Yes     Partners: Male   Other Topics Concern    Caffeine Concern No    Exercise No    Seat Belt No    Special Diet No    Stress  Concern No    Weight Concern Yes     Comment: I think im too fat    Blood Transfusions No           PHYSICAL EXAM:   Patient's last menstrual period was 01/07/2024 (approximate).; Body mass index is 31.58 kg/m².      CONSTITUTIONAL:  Awake, alert, cooperative, no apparent distress  EYES: sclera clear and conjunctiva normal  GASTROINTESTINAL:  soft, non-distended, non-tender, no masses palpated  Laparoscopic incision x 3  GENITAL/URINARY:   Foul odor, red-tinged discharge.  Cuff intact with no granulation tissue or other friable lesions; suture knots present  SKIN:  No rashes  PSYCH:  Affect Normal    Pathology:   Uterus, cervix, bilateral fallopian tubes total laparoscopic hysterectomy and bilateral salpingectomy:   Uterine leiomyoma, x3 (subserosal and intramural), the largest is measuring 1.5 cm in greatest dimension.  Benign endometrial polyp (2.5 cm).  Secretory phase endometrium.  No evidence of endometrial hyperplasia, atypia, or malignancy is identified.  Multifocal myometrial adenomyosis.  Uterus serosal surface with serosal adhesion/fibrosis.  Cervix with nabothian cyst, and mild acute and chronic cervicitis.  No evidence of dysplasia or malignancy is identified.  Bilateral fallopian tube with vascular congestion and small benign paratubal cyst..      ASSESSMENT AND PLAN:  1. Encounter for post surgical wound check  - healing well. Cont post-surgical precautions.  - reviewed benign pathology and intra-op photos.    2. Vaginal discharge  - vikor swab collected    3. Vaginal odor  - swab collected will tx based on results.        Kierra Fitzpatrick, DO

## 2024-02-27 ENCOUNTER — TELEPHONE (OUTPATIENT)
Dept: OBGYN CLINIC | Facility: CLINIC | Age: 44
End: 2024-02-27

## 2024-02-27 NOTE — TELEPHONE ENCOUNTER
Incoming call from Yvonne requesting to speak with a nurse .  A sample has been received but the order is incomplete so they need to speak with a nurse or MD .    Ref # is [id number].

## 2024-02-28 ENCOUNTER — TELEPHONE (OUTPATIENT)
Dept: OBGYN CLINIC | Facility: CLINIC | Age: 44
End: 2024-02-28

## 2024-02-28 RX ORDER — METRONIDAZOLE 7.5 MG/G
1 GEL VAGINAL NIGHTLY
Qty: 70 G | Refills: 0 | Status: SHIPPED | OUTPATIENT
Start: 2024-02-28 | End: 2024-03-04

## 2024-02-28 NOTE — TELEPHONE ENCOUNTER
RN spoke with pt, verified name and . RN provided pt with +BV results and told her that rx was sent to her preferred pharmacy (verified). RN provided admin instructions. RN told pt that RN would send directions for baking soda sitz baths to her [facility] acct. Pt verbalized understanding and agreed with POC.

## 2024-03-16 ENCOUNTER — OFFICE VISIT (OUTPATIENT)
Dept: OBGYN CLINIC | Facility: CLINIC | Age: 44
End: 2024-03-16
Payer: COMMERCIAL

## 2024-03-16 VITALS
WEIGHT: 188 LBS | DIASTOLIC BLOOD PRESSURE: 62 MMHG | BODY MASS INDEX: 32.1 KG/M2 | HEIGHT: 64 IN | SYSTOLIC BLOOD PRESSURE: 100 MMHG

## 2024-03-16 DIAGNOSIS — Z09 POSTOPERATIVE EXAMINATION: Primary | ICD-10-CM

## 2024-03-16 PROCEDURE — 3078F DIAST BP <80 MM HG: CPT | Performed by: OBSTETRICS & GYNECOLOGY

## 2024-03-16 PROCEDURE — 3008F BODY MASS INDEX DOCD: CPT | Performed by: OBSTETRICS & GYNECOLOGY

## 2024-03-16 PROCEDURE — 3074F SYST BP LT 130 MM HG: CPT | Performed by: OBSTETRICS & GYNECOLOGY

## 2024-03-16 PROCEDURE — 99024 POSTOP FOLLOW-UP VISIT: CPT | Performed by: OBSTETRICS & GYNECOLOGY

## 2024-03-16 NOTE — PROGRESS NOTES
RETURN GYN OFFICE VISIT  [facility]    CHIEF COMPLAINT:    Chief Complaint   Patient presents with    Post-Op     Pt states incisions healing well.       HISTORY OF PRESENT ILLNESS:    LUCIA is a 43 year old female  here for post-op exam s/p TLH with bilateral salpingectomy and cystoscopy on 24.   Doing well.  Discharge improved almost immediately after starting antibiotics after last appointment.  No more bleeding or discharge.    No pain    No problems peeing, no problems pooping - errs towards constipation so still working on that.    Returns to work on 3/18.      REVIEW OF SYSTEMS:   CONSTITUTIONAL:  negative for fevers, chills, sweats and fatigue  GASTROINTESTINAL:  negative for nausea, vomiting, blood in stool, constipation, diarrhea and abdominal pain  GENITOURINARY: negative for no dysuria, urgency or frequency; no urinary incontinence; no hematuria  SKIN:  negative for  rash, skin lesion and pruritus  ENDOCRINE:  negative for acne, fatigue, weight gain and weight loss  BEHAVIOR/PSYCH:  negative for depressed mood, anhedonia and anxiety    CURRENT MEDICATIONS:      Current Outpatient Medications:     valACYclovir 1 G Oral Tab, Take 1 tablet (1,000 mg total) by mouth daily. (Patient not taking: Reported on 3/16/2024), Disp: 90 tablet, Rfl: 2    gabapentin 300 MG Oral Cap, Take 1 capsule (300 mg total) by mouth every 8 (eight) hours as needed. (Patient not taking: Reported on 3/16/2024), Disp: 30 capsule, Rfl: 0    ibuprofen 600 MG Oral Tab, Take 1 tablet (600 mg total) by mouth every 6 (six) hours as needed for Pain. (Patient not taking: Reported on 3/16/2024), Disp: 60 tablet, Rfl: 0    mometasone furoate 50 MCG/ACT Nasal Suspension, 2 sprays by Nasal route daily. (Patient not taking: Reported on 3/16/2024), Disp: 3 each, Rfl: 1    FLOVENT HFA 44 MCG/ACT Inhalation Aerosol, Inhale 1 puff into the lungs 2 (two) times daily. (Patient not taking: Reported on 3/16/2024), Disp: , Rfl:      Albuterol Sulfate  (90 Base) MCG/ACT Inhalation Aero Soln, Inhale 2 puffs into the lungs every 6 (six) hours as needed for Wheezing or Shortness of Breath. (Patient not taking: Reported on 3/16/2024), Disp: 1 Inhaler, Rfl: 1    BORIC ACID 600MG V-CAP, Insert 1 capsule VAGINALLY nightly for 7-14 nights then 2-3 times weekly as needed. (Patient not taking: Reported on 3/16/2024), Disp: 30 capsule, Rfl: 0    PAST MEDICAL, SOCIAL AND FAMILY HISTORY:    Past Medical History:   Diagnosis Date    Anemia     YRS AGO    Asthma (HCC)     BV (bacterial vaginosis)     Dysmenorrhea     Dyspareunia     Esophageal reflux     Fibroids     Heartburn     IBS (irritable bowel syndrome)     Menstrual suppression 03/11/2021    Migraines     Pneumonia due to organism     years ago    Submucous uterine fibroid 01/31/2023     Past Surgical History:   Procedure Laterality Date    COLPOSCOPY, CERVIX W/UPPER ADJACENT VAGINA; W/BIOPSY(S), CERVIX      HYSTERECTOMY  01/31/2024    laparoscopic hys with bilateral salpingectomy    HYSTEROSCOPY  04/20/2023    Hysteroscopy with myomectomy, resection of a submucosal fibroid with MyoSure and an endometrial ablation with NovaSure.    LEEP  06/2013     Family History   Problem Relation Age of Onset    Hypertension Father     Other (Covid) Father         Pneumonia     Hypertension Mother     Diabetes Maternal Grandfather     Diabetes Maternal Uncle     Breast Cancer Neg     Ovarian Cancer Neg     Colon Cancer Neg      Social History     Socioeconomic History    Marital status:    Occupational History    Occupation:     Occupation:    Tobacco Use    Smoking status: Never    Smokeless tobacco: Never   Vaping Use    Vaping Use: Never used   Substance and Sexual Activity    Alcohol use: Yes     Comment: socially    Drug use: No    Sexual activity: Yes     Partners: Male   Other Topics Concern    Caffeine Concern No    Exercise No    Seat Belt No    Special Diet No     Stress Concern No    Weight Concern Yes     Comment: I think im too fat    Blood Transfusions No           PHYSICAL EXAM:   Patient's last menstrual period was 01/07/2024 (approximate).; Body mass index is 32.27 kg/m².      CONSTITUTIONAL:  Awake, alert, cooperative, no apparent distress  EYES: sclera clear and conjunctiva normal  GASTROINTESTINAL:  soft, non-distended, non-tender, no masses palpated  Laparoscopic incision x 3 well healed.  GENITAL/URINARY:   Vaginal cuff intact, smooth and nontender.  No vaginal discharge.    SKIN:  No rashes  PSYCH:  Affect Normal      ASSESSMENT AND PLAN:  1. Post-op exam  - well healed. Ok to return to all normal activities.    Fu 1 year for annual exam.      Kierra Fitzpatrick, DO

## 2024-04-10 ENCOUNTER — OFFICE VISIT (OUTPATIENT)
Facility: CLINIC | Age: 44
End: 2024-04-10
Payer: COMMERCIAL

## 2024-04-10 ENCOUNTER — LAB ENCOUNTER (OUTPATIENT)
Dept: LAB | Facility: REFERENCE LAB | Age: 44
End: 2024-04-10
Attending: FAMILY MEDICINE
Payer: COMMERCIAL

## 2024-04-10 VITALS
OXYGEN SATURATION: 98 % | HEIGHT: 64 IN | DIASTOLIC BLOOD PRESSURE: 68 MMHG | BODY MASS INDEX: 31.58 KG/M2 | SYSTOLIC BLOOD PRESSURE: 112 MMHG | HEART RATE: 83 BPM | WEIGHT: 185 LBS

## 2024-04-10 DIAGNOSIS — Z00.00 PHYSICAL EXAM, ANNUAL: Primary | ICD-10-CM

## 2024-04-10 DIAGNOSIS — R41.89 BRAIN FOG: ICD-10-CM

## 2024-04-10 DIAGNOSIS — Z00.00 PHYSICAL EXAM, ANNUAL: ICD-10-CM

## 2024-04-10 DIAGNOSIS — H53.8 BLURRY VISION: ICD-10-CM

## 2024-04-10 DIAGNOSIS — Z12.31 ENCOUNTER FOR SCREENING MAMMOGRAM FOR MALIGNANT NEOPLASM OF BREAST: ICD-10-CM

## 2024-04-10 DIAGNOSIS — J45.30 MILD PERSISTENT ASTHMA WITHOUT COMPLICATION (HCC): ICD-10-CM

## 2024-04-10 DIAGNOSIS — J30.9 ALLERGIC RHINITIS, UNSPECIFIED SEASONALITY, UNSPECIFIED TRIGGER: ICD-10-CM

## 2024-04-10 LAB
ALBUMIN SERPL-MCNC: 4.7 G/DL (ref 3.2–4.8)
ALBUMIN/GLOB SERPL: 1.6 {RATIO} (ref 1–2)
ALP LIVER SERPL-CCNC: 94 U/L
ALT SERPL-CCNC: 13 U/L
ANION GAP SERPL CALC-SCNC: 4 MMOL/L (ref 0–18)
AST SERPL-CCNC: 22 U/L (ref ?–34)
BASOPHILS # BLD AUTO: 0.05 X10(3) UL (ref 0–0.2)
BASOPHILS NFR BLD AUTO: 0.8 %
BILIRUB SERPL-MCNC: 0.5 MG/DL (ref 0.3–1.2)
BUN BLD-MCNC: 16 MG/DL (ref 9–23)
BUN/CREAT SERPL: 15.8 (ref 10–20)
CALCIUM BLD-MCNC: 10 MG/DL (ref 8.7–10.4)
CHLORIDE SERPL-SCNC: 108 MMOL/L (ref 98–112)
CHOLEST SERPL-MCNC: 247 MG/DL (ref ?–200)
CO2 SERPL-SCNC: 27 MMOL/L (ref 21–32)
CREAT BLD-MCNC: 1.01 MG/DL
DEPRECATED RDW RBC AUTO: 46.8 FL (ref 35.1–46.3)
EGFRCR SERPLBLD CKD-EPI 2021: 71 ML/MIN/1.73M2 (ref 60–?)
EOSINOPHIL # BLD AUTO: 0.09 X10(3) UL (ref 0–0.7)
EOSINOPHIL NFR BLD AUTO: 1.4 %
ERYTHROCYTE [DISTWIDTH] IN BLOOD BY AUTOMATED COUNT: 15.2 % (ref 11–15)
EST. AVERAGE GLUCOSE BLD GHB EST-MCNC: 108 MG/DL (ref 68–126)
FASTING PATIENT LIPID ANSWER: YES
FASTING STATUS PATIENT QL REPORTED: YES
GLOBULIN PLAS-MCNC: 2.9 G/DL (ref 2.8–4.4)
GLUCOSE BLD-MCNC: 99 MG/DL (ref 70–99)
HBA1C MFR BLD: 5.4 % (ref ?–5.7)
HCT VFR BLD AUTO: 36.7 %
HDLC SERPL-MCNC: 83 MG/DL (ref 40–59)
HGB BLD-MCNC: 11.4 G/DL
IMM GRANULOCYTES # BLD AUTO: 0.01 X10(3) UL (ref 0–1)
IMM GRANULOCYTES NFR BLD: 0.2 %
LDLC SERPL CALC-MCNC: 153 MG/DL (ref ?–100)
LYMPHOCYTES # BLD AUTO: 0.88 X10(3) UL (ref 1–4)
LYMPHOCYTES NFR BLD AUTO: 13.6 %
MCH RBC QN AUTO: 26.3 PG (ref 26–34)
MCHC RBC AUTO-ENTMCNC: 31.1 G/DL (ref 31–37)
MCV RBC AUTO: 84.8 FL
MONOCYTES # BLD AUTO: 0.46 X10(3) UL (ref 0.1–1)
MONOCYTES NFR BLD AUTO: 7.1 %
NEUTROPHILS # BLD AUTO: 4.98 X10 (3) UL (ref 1.5–7.7)
NEUTROPHILS # BLD AUTO: 4.98 X10(3) UL (ref 1.5–7.7)
NEUTROPHILS NFR BLD AUTO: 76.9 %
NONHDLC SERPL-MCNC: 164 MG/DL (ref ?–130)
OSMOLALITY SERPL CALC.SUM OF ELEC: 289 MOSM/KG (ref 275–295)
PLATELET # BLD AUTO: 418 10(3)UL (ref 150–450)
POTASSIUM SERPL-SCNC: 4.4 MMOL/L (ref 3.5–5.1)
PROT SERPL-MCNC: 7.6 G/DL (ref 5.7–8.2)
RBC # BLD AUTO: 4.33 X10(6)UL
SODIUM SERPL-SCNC: 139 MMOL/L (ref 136–145)
TRIGL SERPL-MCNC: 66 MG/DL (ref 30–149)
TSI SER-ACNC: 1.61 MIU/ML (ref 0.55–4.78)
VIT D+METAB SERPL-MCNC: 12.4 NG/ML (ref 30–100)
VLDLC SERPL CALC-MCNC: 12 MG/DL (ref 0–30)
WBC # BLD AUTO: 6.5 X10(3) UL (ref 4–11)

## 2024-04-10 PROCEDURE — 99214 OFFICE O/P EST MOD 30 MIN: CPT | Performed by: FAMILY MEDICINE

## 2024-04-10 PROCEDURE — 80061 LIPID PANEL: CPT | Performed by: FAMILY MEDICINE

## 2024-04-10 PROCEDURE — 82306 VITAMIN D 25 HYDROXY: CPT | Performed by: FAMILY MEDICINE

## 2024-04-10 PROCEDURE — 3008F BODY MASS INDEX DOCD: CPT | Performed by: FAMILY MEDICINE

## 2024-04-10 PROCEDURE — 3074F SYST BP LT 130 MM HG: CPT | Performed by: FAMILY MEDICINE

## 2024-04-10 PROCEDURE — 96127 BRIEF EMOTIONAL/BEHAV ASSMT: CPT | Performed by: FAMILY MEDICINE

## 2024-04-10 PROCEDURE — 83036 HEMOGLOBIN GLYCOSYLATED A1C: CPT | Performed by: FAMILY MEDICINE

## 2024-04-10 PROCEDURE — 3078F DIAST BP <80 MM HG: CPT | Performed by: FAMILY MEDICINE

## 2024-04-10 PROCEDURE — 80050 GENERAL HEALTH PANEL: CPT | Performed by: FAMILY MEDICINE

## 2024-04-10 PROCEDURE — 99396 PREV VISIT EST AGE 40-64: CPT | Performed by: FAMILY MEDICINE

## 2024-04-10 RX ORDER — FLUTICASONE PROPIONATE 50 MCG
2 SPRAY, SUSPENSION (ML) NASAL DAILY
Qty: 1 EACH | Refills: 2 | Status: SHIPPED | OUTPATIENT
Start: 2024-04-10

## 2024-04-10 NOTE — PROGRESS NOTES
HPI:   Sarah Sawyer is a 43 year old female who presents for a complete physical exam.     Asthma: Has not used Flovent in >1 year. Never needs albuterol.     Vision has been blurry for past month. Many years since last eye exam.     Has lots of brain fog. Has been an issue for past few months. Sleeps well. No regular exercise. Diet is terrible. Has been quintanilla.     Ears have been bothering her. Feel congested. Allergies have been bad. Runny nose. Sneezing. Itchy throat. No current AR meds.     Last pap: sees gyne and s/p TLH w/ b/l salpingectomy in 01/2024  Last mammogram: negative in 07/2022.   Previous colonoscopy: Never   Family hx of breast, ovarian, cervical or colon CA: See FH  Diet and exercise: See above  Immunizations:  Tdap: 2018, PCV20: to consider, Covid: consider new booster    REVIEW OF SYSTEMS:   GENERAL: feels well otherwise. See HPI  SKIN: denies any unusual skin lesions  EYES: no vision problems  BREAST: negative  LUNGS: denies shortness of breath  CARDIOVASCULAR: denies chest pain  GI: denies abdominal pain,  No constipation or diarrhea, no hematochezia or melena  : negative  NEURO: denies headaches  PSYCHE: denies depression or anxiety          Current Outpatient Medications   Medication Sig Dispense Refill    fluticasone propionate 50 MCG/ACT Nasal Suspension 2 sprays by Each Nare route daily. 1 each 2    valACYclovir 1 G Oral Tab Take 1 tablet (1,000 mg total) by mouth daily. 90 tablet 2    ibuprofen 600 MG Oral Tab Take 1 tablet (600 mg total) by mouth every 6 (six) hours as needed for Pain. 60 tablet 0    Albuterol Sulfate  (90 Base) MCG/ACT Inhalation Aero Soln Inhale 2 puffs into the lungs every 6 (six) hours as needed for Wheezing or Shortness of Breath. 1 Inhaler 1    FLOVENT HFA 44 MCG/ACT Inhalation Aerosol Inhale 1 puff into the lungs 2 (two) times daily.       No Known Allergies   Past Medical History:    Anemia    YRS AGO    Asthma (HCC)    BV (bacterial vaginosis)     Dysmenorrhea    Dyspareunia    Esophageal reflux    Fibroids    Heartburn    IBS (irritable bowel syndrome)    Menstrual suppression    Migraines    Pneumonia due to organism    years ago    Submucous uterine fibroid      Past Surgical History:   Procedure Laterality Date    Colposcopy, cervix w/upper adjacent vagina; w/biopsy(s), cervix      Hysterectomy  01/31/2024    laparoscopic hys with bilateral salpingectomy    Hysteroscopy  04/20/2023    Hysteroscopy with myomectomy, resection of a submucosal fibroid with MyoSure and an endometrial ablation with NovaSure.    Leep  06/2013      Family History   Problem Relation Age of Onset    Hypertension Father     Other (Covid) Father         Pneumonia     Hypertension Mother     Diabetes Maternal Grandfather     Diabetes Maternal Uncle     Breast Cancer Neg     Ovarian Cancer Neg     Colon Cancer Neg       Social History:   Social History     Socioeconomic History    Marital status:    Occupational History    Occupation:     Occupation:    Tobacco Use    Smoking status: Never    Smokeless tobacco: Never   Vaping Use    Vaping status: Never Used   Substance and Sexual Activity    Alcohol use: Yes     Comment: socially    Drug use: No    Sexual activity: Yes     Partners: Male   Other Topics Concern    Caffeine Concern No    Exercise No    Seat Belt No    Special Diet No    Stress Concern No    Weight Concern Yes     Comment: I think im too fat    Blood Transfusions No   Social History Narrative    Lives with partner and daughter    Son away at school    Works 9pm-5am for [employer]     Social Determinants of Health      [facility]    Social Connections    [facility]    Housing Stability            EXAM:     Wt Readings from Last 6 Encounters:   04/10/24 185 lb (83.9 kg)   03/16/24 188 lb (85.3 kg)   02/21/24  184 lb (83.5 kg)   02/14/24 184 lb (83.5 kg)   01/31/24 184 lb 11.2 oz (83.8 kg)   12/13/23 169 lb (76.7 kg)     Body mass index is 31.76 kg/m².   /68   Pulse 83   Ht 5' 4\" (1.626 m)   Wt 185 lb (83.9 kg)   LMP 01/07/2024 (Approximate)   SpO2 98%   BMI 31.76 kg/m²     GENERAL: well developed, well nourished, in no apparent distress   SKIN: no rashes, no suspicious lesions  HEENT: atraumatic, normocephalic, throat clear; normal dentition. MMM. TM & EAC normal b/l.   EYES: PERRLA, EOMI, conjunctiva are clear  NECK: supple, no adenopathy or thyroid masses   LUNGS: clear to auscultation  CARDIO: RRR without murmur  GI: good bowel sounds, no masses, HSM or tenderness  EXTREMITIES: no edema  NEURO: Alert & oriented, motor & sensation grossly intact and symmetric    Cholesterol, Total (mg/dL)   Date Value   12/18/2021 272 (H)   02/28/2020 209 (H)   04/02/2019 206 (H)   03/07/2018 194     HDL Cholesterol (mg/dL)   Date Value   12/18/2021 89 (H)   02/28/2020 88 (H)   04/02/2019 71 (H)   03/07/2018 55     LDL Cholesterol (mg/dL)   Date Value   12/18/2021 173 (H)   02/28/2020 112 (H)   04/02/2019 124 (H)   03/07/2018 130 (H)      ASSESSMENT AND PLAN:   Sarah Sawyer is a 43 year old female who presents for a complete physical exam.  Encounter Diagnoses   Name Primary?    Physical exam, annual Yes    Mild persistent asthma without complication (HCC)     Encounter for screening mammogram for malignant neoplasm of breast     Brain fog     Allergic rhinitis, unspecified seasonality, unspecified trigger     Blurry vision      Orders Placed This Encounter   Procedures    Hemoglobin A1C    CBC With Differential With Platelet    Comp Metabolic Panel (14)    Lipid Panel    TSH W Reflex To Free T4    Vitamin D       -AR: Acute on chronic. Start flonase.   -Brain fog: Chronic. Discussed lifestyle changes and recommend regular exercise and healthy eating. Check labs.   -Asthma: Well-controlled. Albuterol prn. Reviewed return  precautions.  -Annual labs ordered.   -Overdue for mammogram.     Discussed with patient the following:  -Breast cancer screening/mammograms and clinical breast exams  -Cervical cancer screening/pap smears  -Colon cancer screening/colonoscopy  -Adequate calcium and Vitamin D intake to prevent osteoporosis  -Bone density screening for osteopenia/osteoporosis  -Healthy diet including adequate intake of vegetables and fruits, appropriate portion sizes, minimizing highly concentrated carbohydrate foods  -Exercising 30 minutes a day most days of the week   -Diabetes screening    -Cholesterol screening   -Recommendation for yearly influenza vaccine  -Need for Tdap once as an adult and Td booster every 10 years  -Need for Zoster vaccine for patients >= 50    All questions were answered during the visit and the patient verbalizes understanding. She will return in one year for next WWE or sooner as needed.    Meds & Refills for this Visit:  Requested Prescriptions     Signed Prescriptions Disp Refills    fluticasone propionate 50 MCG/ACT Nasal Suspension 1 each 2     Si sprays by Each Nare route daily.       Imaging & Consults:  [facility] KAYODE 2D+3D SCREENING BILAT (CPT=77067/97456)    Sung Luna DO  4/10/2024  9:43 AM

## 2024-08-01 ENCOUNTER — OFFICE VISIT (OUTPATIENT)
Dept: OBGYN CLINIC | Facility: CLINIC | Age: 44
End: 2024-08-01
Payer: COMMERCIAL

## 2024-08-01 VITALS
WEIGHT: 164 LBS | HEIGHT: 64 IN | BODY MASS INDEX: 28 KG/M2 | DIASTOLIC BLOOD PRESSURE: 64 MMHG | SYSTOLIC BLOOD PRESSURE: 106 MMHG

## 2024-08-01 DIAGNOSIS — N76.0 BACTERIAL VAGINOSIS: Primary | ICD-10-CM

## 2024-08-01 DIAGNOSIS — B96.89 BACTERIAL VAGINOSIS: Primary | ICD-10-CM

## 2024-08-01 DIAGNOSIS — N95.1 MENOPAUSAL SYMPTOMS: ICD-10-CM

## 2024-08-01 PROCEDURE — 3008F BODY MASS INDEX DOCD: CPT | Performed by: OBSTETRICS & GYNECOLOGY

## 2024-08-01 PROCEDURE — 3074F SYST BP LT 130 MM HG: CPT | Performed by: OBSTETRICS & GYNECOLOGY

## 2024-08-01 PROCEDURE — 99213 OFFICE O/P EST LOW 20 MIN: CPT | Performed by: OBSTETRICS & GYNECOLOGY

## 2024-08-01 PROCEDURE — 3078F DIAST BP <80 MM HG: CPT | Performed by: OBSTETRICS & GYNECOLOGY

## 2024-08-01 RX ORDER — FLUCONAZOLE 150 MG/1
150 TABLET ORAL ONCE
Qty: 2 TABLET | Refills: 0 | Status: SHIPPED | OUTPATIENT
Start: 2024-08-01 | End: 2024-08-01

## 2024-08-01 RX ORDER — METRONIDAZOLE 7.5 MG/G
1 GEL VAGINAL NIGHTLY
Qty: 70 G | Refills: 0 | Status: SHIPPED | OUTPATIENT
Start: 2024-08-01 | End: 2024-08-08

## 2024-08-01 NOTE — PATIENT INSTRUCTIONS
To help with menopause symptoms:    Ashwagandha - 300 mg twice per day    [employer] is a [employer] responsible company.. Or you can find any brand that offers the above dosing.

## 2024-08-01 NOTE — PROGRESS NOTES
RETURN GYN OFFICE VISIT  [facility]    CHIEF COMPLAINT:    Chief Complaint   Patient presents with    Vaginal Problem     Pt states she thought she had a yeast infection, took pills, but then starting to get \"fishy\" odor yesterday.        HISTORY OF PRESENT ILLNESS:    LUCIA is a 43 year old female  here for   Had yeast ifnection - itching and discharge (clumpy yellow-miryam) with no odor.  Took OTC pill which helped that, but then noted a fishy odor and still has discharge.    Also has noted feeling menopause complaints.  Peck / irritable. Doesn't sleep regularly bc she works a night and a day job. So sleeps when she can. Some muscle / joint achiness also.      REVIEW OF SYSTEMS:   CONSTITUTIONAL:  negative for fevers, chills, sweats and fatigue  GASTROINTESTINAL:  negative for nausea, vomiting, blood in stool, constipation, diarrhea and abdominal pain  GENITOURINARY: negative for no dysuria, urgency or frequency; no urinary incontinence; no hematuria  SKIN:  negative for  rash, skin lesion and pruritus  ENDOCRINE:  negative for acne, fatigue, weight gain and weight loss  BEHAVIOR/PSYCH:  negative for depressed mood, anhedonia and anxiety    CURRENT MEDICATIONS:      Current Outpatient Medications:     metroNIDAZOLE 0.75 % Vaginal Gel, Place 1 applicator vaginally nightly for 7 days., Disp: 70 g, Rfl: 0    fluconazole 150 MG Oral Tab, Take 1 tablet (150 mg total) by mouth once for 1 dose., Disp: 2 tablet, Rfl: 0    valACYclovir 1 G Oral Tab, Take 1 tablet (1,000 mg total) by mouth daily., Disp: 90 tablet, Rfl: 2    fluticasone propionate 50 MCG/ACT Nasal Suspension, 2 sprays by Each Nare route daily. (Patient not taking: Reported on 2024), Disp: 1 each, Rfl: 2    ibuprofen 600 MG Oral Tab, Take 1 tablet (600 mg total) by mouth every 6 (six) hours as needed for Pain. (Patient not taking: Reported on 2024), Disp: 60 tablet, Rfl: 0    FLOVENT HFA 44 MCG/ACT Inhalation Aerosol, Inhale 1 puff into  the lungs 2 (two) times daily. (Patient not taking: Reported on 8/1/2024), Disp: , Rfl:     Albuterol Sulfate  (90 Base) MCG/ACT Inhalation Aero Soln, Inhale 2 puffs into the lungs every 6 (six) hours as needed for Wheezing or Shortness of Breath. (Patient not taking: Reported on 8/1/2024), Disp: 1 Inhaler, Rfl: 1    PAST MEDICAL, SOCIAL AND FAMILY HISTORY:    Past Medical History:    Anemia    YRS AGO    Asthma (HCC)    BV (bacterial vaginosis)    Dysmenorrhea    Dyspareunia    Esophageal reflux    Fibroids    Heartburn    IBS (irritable bowel syndrome)    Menstrual suppression    Migraines    Pneumonia due to organism    years ago    Submucous uterine fibroid     Past Surgical History:   Procedure Laterality Date    Colposcopy, cervix w/upper adjacent vagina; w/biopsy(s), cervix      Hysterectomy  01/31/2024    laparoscopic hys with bilateral salpingectomy    Hysteroscopy  04/20/2023    Hysteroscopy with myomectomy, resection of a submucosal fibroid with MyoSure and an endometrial ablation with NovaSure.    Leep  06/2013     Family History   Problem Relation Age of Onset    Hypertension Father     Other (Covid) Father         Pneumonia     Hypertension Mother     Diabetes Maternal Grandfather     Diabetes Maternal Uncle     Breast Cancer Neg     Ovarian Cancer Neg     Colon Cancer Neg      Social History     Socioeconomic History    Marital status:    Occupational History    Occupation:     Occupation:    Tobacco Use    Smoking status: Never    Smokeless tobacco: Never   Vaping Use    Vaping status: Never Used   Substance and Sexual Activity    Alcohol use: Yes     Comment: socially    Drug use: No    Sexual activity: Yes     Partners: Male   Other Topics Concern    Caffeine Concern No    Exercise No    Seat Belt No    Special Diet No    Stress Concern No    Weight Concern Yes     Comment: I think im too fat    Blood Transfusions No           PHYSICAL EXAM:   Patient's last  menstrual period was 01/07/2024 (approximate).; Body mass index is 28.15 kg/m².      CONSTITUTIONAL:  Awake, alert, cooperative, no apparent distress  EYES: sclera clear and conjunctiva normal  GASTROINTESTINAL:  soft, non-distended, non-tender, no masses palpated  GENITAL/URINARY:    External Genitalia:  General appearance; normal, Hair distribution; normal, Lesions absent   Urethral Meatus:  Lesions absent, Prolapse absent  Bladder:  Tenderness absent, Cystocele absent  Vagina:  Discharge thick creamy white with fishy odorl  SKIN:  No rashes  PSYCH:  Affect Normal      ASSESSMENT AND PLAN:  1. Bacterial vaginosis  - vikor swab today.  - willl trx empirically with metrogel. Rx sent.      Medications    metroNIDAZOLE 0.75 % Vaginal Gel    fluconazole 150 MG Oral Tab         2. Menopausal symptoms  - recommend ashwagandha, info given.        Kierra Fitzpatrick, DO

## 2024-08-09 ENCOUNTER — TELEPHONE (OUTPATIENT)
Dept: OBGYN CLINIC | Facility: CLINIC | Age: 44
End: 2024-08-09

## 2024-08-09 RX ORDER — AZITHROMYCIN 500 MG/1
1000 TABLET, FILM COATED ORAL ONCE
Qty: 2 TABLET | Refills: 0 | Status: SHIPPED | OUTPATIENT
Start: 2024-08-09 | End: 2024-08-09

## 2024-08-09 NOTE — TELEPHONE ENCOUNTER
+BV and ureaplasma- already treated with Metrogel and Azithromycin     Called pt informed of results and treatment, agrees.

## 2024-09-12 ENCOUNTER — OFFICE VISIT (OUTPATIENT)
Dept: OBGYN CLINIC | Facility: CLINIC | Age: 44
End: 2024-09-12
Payer: COMMERCIAL

## 2024-09-12 VITALS
SYSTOLIC BLOOD PRESSURE: 112 MMHG | DIASTOLIC BLOOD PRESSURE: 72 MMHG | BODY MASS INDEX: 27.96 KG/M2 | HEIGHT: 64 IN | WEIGHT: 163.81 LBS

## 2024-09-12 DIAGNOSIS — N76.0 VAGINITIS AND VULVOVAGINITIS: Primary | ICD-10-CM

## 2024-09-12 PROCEDURE — 81514 NFCT DS BV&VAGINITIS DNA ALG: CPT | Performed by: OBSTETRICS & GYNECOLOGY

## 2024-09-12 PROCEDURE — 3074F SYST BP LT 130 MM HG: CPT | Performed by: OBSTETRICS & GYNECOLOGY

## 2024-09-12 PROCEDURE — 3008F BODY MASS INDEX DOCD: CPT | Performed by: OBSTETRICS & GYNECOLOGY

## 2024-09-12 PROCEDURE — 99213 OFFICE O/P EST LOW 20 MIN: CPT | Performed by: OBSTETRICS & GYNECOLOGY

## 2024-09-12 PROCEDURE — 3078F DIAST BP <80 MM HG: CPT | Performed by: OBSTETRICS & GYNECOLOGY

## 2024-09-12 RX ORDER — METRONIDAZOLE 7.5 MG/G
1 GEL VAGINAL NIGHTLY
Qty: 70 G | Refills: 0 | Status: SHIPPED | OUTPATIENT
Start: 2024-09-12 | End: 2024-09-19

## 2024-09-12 RX ORDER — FLUCONAZOLE 150 MG/1
TABLET ORAL
Qty: 2 TABLET | Refills: 0 | Status: SHIPPED | OUTPATIENT
Start: 2024-09-12

## 2024-09-12 NOTE — PROGRESS NOTES
GYNE PROBLEM VISIT     HPI:   Sarah Sawyer is a 44 year old female presenting with abnormal vaginal discharge. The discharge is thick and white and has a fishy odor. She has had this intermittently since her teens. She denies any association with intercourse, stress, activity level or hygiene. She reports getting yeast infections every time she is treated for BV. She tried boric acid suppositories in the past but never a prolonged course.     Medications (Active prior to today's visit):  Current Outpatient Medications   Medication Sig Dispense Refill    fluticasone propionate 50 MCG/ACT Nasal Suspension 2 sprays by Each Nare route daily. (Patient not taking: Reported on 8/1/2024) 1 each 2    valACYclovir 1 G Oral Tab Take 1 tablet (1,000 mg total) by mouth daily. 90 tablet 2    ibuprofen 600 MG Oral Tab Take 1 tablet (600 mg total) by mouth every 6 (six) hours as needed for Pain. (Patient not taking: Reported on 8/1/2024) 60 tablet 0    FLOVENT HFA 44 MCG/ACT Inhalation Aerosol Inhale 1 puff into the lungs 2 (two) times daily. (Patient not taking: Reported on 8/1/2024)      Albuterol Sulfate  (90 Base) MCG/ACT Inhalation Aero Soln Inhale 2 puffs into the lungs every 6 (six) hours as needed for Wheezing or Shortness of Breath. (Patient not taking: Reported on 8/1/2024) 1 Inhaler 1       Allergies:  No Known Allergies    LMP 01/07/2024 (Approximate)     PHYSICAL EXAM:   GENERAL: Well developed, well nourished, in no apparent distress  ABDOMEN: Soft, non distended, non tender, no masses  GYNE/:   External Genitalia: normal, no lesions, good perineal support           Vagina: normal mucosa, no lesions, thick white discharge discharge   Uterus: absent                     Cervix: absent                R/V: normal perineum, no hemorrhoids  EXTREMITIES: nontender without edema      ASSESSMENT/PLAN:       #Vaginitis  -abnormal vaginal discharge similar to prior episodes of BV  -prior Vikor swab showing  gardnerella and mycoplasma, s/p treatment  -recommend condom use to mitigate vaginal microbiome change related to intercourse  -discussed vulvar hygiene   -discussed treatment of recurrent BV. Will plan for 1 week course of metrogel followed by 3 weeks of boric acid suppositories. Safety precautions reviewed. Patient will return in 1 month for KATHY at which time if BV has resolved we will start suppressive treatment    Follow up 1 month     Cornelia Casey DO

## 2024-09-13 LAB
BV BACTERIA DNA VAG QL NAA+PROBE: POSITIVE
C GLABRATA DNA VAG QL NAA+PROBE: NEGATIVE
C KRUSEI DNA VAG QL NAA+PROBE: NEGATIVE
CANDIDA DNA VAG QL NAA+PROBE: POSITIVE
T VAGINALIS DNA VAG QL NAA+PROBE: NEGATIVE

## 2024-09-13 RX ORDER — FLUCONAZOLE 150 MG/1
TABLET ORAL
Qty: 2 TABLET | Refills: 0 | Status: SHIPPED | OUTPATIENT
Start: 2024-09-13

## 2024-09-13 NOTE — PROGRESS NOTES
Pt contacted,  and name verified. Pt provided with lab result and msg from provider. RN told pt that rx would be sent to her preferred pharmacy (verified). RN provided admin instructions. Pt verbalized understanding and agreed with POC.

## 2024-10-10 ENCOUNTER — HOSPITAL ENCOUNTER (OUTPATIENT)
Dept: MAMMOGRAPHY | Age: 44
Discharge: HOME | End: 2024-10-10
Attending: FAMILY MEDICINE
Payer: COMMERCIAL

## 2024-10-10 DIAGNOSIS — Z12.31 ENCOUNTER FOR SCREENING MAMMOGRAM FOR MALIGNANT NEOPLASM OF BREAST: ICD-10-CM

## 2024-10-10 PROCEDURE — 77067 SCR MAMMO BI INCL CAD: CPT | Performed by: FAMILY MEDICINE

## 2024-10-10 PROCEDURE — 77063 BREAST TOMOSYNTHESIS BI: CPT | Performed by: FAMILY MEDICINE

## 2024-11-05 ENCOUNTER — OFFICE VISIT (OUTPATIENT)
Dept: OBGYN CLINIC | Facility: CLINIC | Age: 44
End: 2024-11-05
Payer: COMMERCIAL

## 2024-11-05 VITALS
WEIGHT: 163 LBS | SYSTOLIC BLOOD PRESSURE: 108 MMHG | DIASTOLIC BLOOD PRESSURE: 60 MMHG | BODY MASS INDEX: 27.83 KG/M2 | HEIGHT: 64 IN

## 2024-11-05 DIAGNOSIS — N89.8 VAGINAL DISCHARGE: Primary | ICD-10-CM

## 2024-11-05 PROCEDURE — 3078F DIAST BP <80 MM HG: CPT | Performed by: OBSTETRICS & GYNECOLOGY

## 2024-11-05 PROCEDURE — 3074F SYST BP LT 130 MM HG: CPT | Performed by: OBSTETRICS & GYNECOLOGY

## 2024-11-05 PROCEDURE — 99213 OFFICE O/P EST LOW 20 MIN: CPT | Performed by: OBSTETRICS & GYNECOLOGY

## 2024-11-05 PROCEDURE — 99459 PELVIC EXAMINATION: CPT | Performed by: OBSTETRICS & GYNECOLOGY

## 2024-11-05 PROCEDURE — 3008F BODY MASS INDEX DOCD: CPT | Performed by: OBSTETRICS & GYNECOLOGY

## 2024-11-05 PROCEDURE — 81514 NFCT DS BV&VAGINITIS DNA ALG: CPT | Performed by: OBSTETRICS & GYNECOLOGY

## 2024-11-06 RX ORDER — FLUCONAZOLE 150 MG/1
150 TABLET ORAL DAILY
Qty: 2 TABLET | Refills: 0 | Status: SHIPPED | OUTPATIENT
Start: 2024-11-06 | End: 2024-11-08

## 2024-11-11 ENCOUNTER — OFFICE VISIT (OUTPATIENT)
Facility: CLINIC | Age: 44
End: 2024-11-11
Payer: COMMERCIAL

## 2024-11-11 VITALS
WEIGHT: 160 LBS | BODY MASS INDEX: 27.31 KG/M2 | HEIGHT: 64 IN | SYSTOLIC BLOOD PRESSURE: 110 MMHG | HEART RATE: 70 BPM | OXYGEN SATURATION: 97 % | DIASTOLIC BLOOD PRESSURE: 74 MMHG

## 2024-11-11 DIAGNOSIS — E55.9 VITAMIN D DEFICIENCY: ICD-10-CM

## 2024-11-11 DIAGNOSIS — R41.3 MEMORY LOSS: Primary | ICD-10-CM

## 2024-11-11 PROBLEM — F32.1 CURRENT MODERATE EPISODE OF MAJOR DEPRESSIVE DISORDER WITHOUT PRIOR EPISODE (HCC): Status: RESOLVED | Noted: 2022-07-06 | Resolved: 2024-11-11

## 2024-11-11 PROCEDURE — 3074F SYST BP LT 130 MM HG: CPT | Performed by: FAMILY MEDICINE

## 2024-11-11 PROCEDURE — 99213 OFFICE O/P EST LOW 20 MIN: CPT | Performed by: FAMILY MEDICINE

## 2024-11-11 PROCEDURE — 3008F BODY MASS INDEX DOCD: CPT | Performed by: FAMILY MEDICINE

## 2024-11-11 PROCEDURE — 3078F DIAST BP <80 MM HG: CPT | Performed by: FAMILY MEDICINE

## 2024-11-11 RX ORDER — YOHIMBE BARK 500 MG
1 CAPSULE ORAL 3 TIMES DAILY
COMMUNITY

## 2024-11-11 RX ORDER — ALBUTEROL SULFATE 90 UG/1
2 INHALANT RESPIRATORY (INHALATION) EVERY 6 HOURS PRN
Qty: 1 EACH | Refills: 3 | Status: SHIPPED | OUTPATIENT
Start: 2024-11-11

## 2024-11-11 NOTE — PROGRESS NOTES
CC:    Chief Complaint   Patient presents with    Follow - Up     Not sure if it is postmenopausal but her memory has been very very bad.    Medication Request     Needs refills on her inhaler       HPI: 44 year old female here for memory concerns and requesting refills on inhalers.  Has been having more memory loss and confusion over the past year.   She did not remember she had a physical earlier this year until she was reminded of this.  Has been having more brain fog and word recall issues.  Denies getting lost while driving, and also denies any issues with handling household or work tasks.   Started having irregular menstrual cycles a few years ago, but had a hysterectomy in January of 2024 due to fibroids and heavy bleeding.  Has had more moodiness as well.   Has not had any hot flashes or vaginal dryness, but does have a lower libido.  Denies any anxiety or depression.  Has tried ashwagandha, which has helped her stay calmer.    Her mom did have dementia at 67, but no other known family history or memory loss or dementia.    Her asthma has been okay, but will flare up when going up and down stairs.   Does not use her Albuterol very often.     ROS:  General:  No fever, no fatigue, no weight changes, no hot flashes   HEENT:  Denies congestion or nasal discharge, bilateral ear pressure  Cardio:  No chest pain  Pulmonary:  No cough, no SOB, no wheezing   GI:  No N/V/D  Dermatologic:  No rashes  Neuro; memory loss, no dizziness, no chronic headaches   Psych: no depression or anxiety, moodiness     Past Medical History:    Allergic rhinitis    Anemia    YRS AGO    Asthma (HCC)    BV (bacterial vaginosis)    Dysmenorrhea    Dyspareunia    Esophageal reflux    Fibroids    Heartburn    IBS (irritable bowel syndrome)    Menstrual suppression    Migraines    Pneumonia due to organism    years ago    Submucous uterine fibroid       Social History     Socioeconomic History    Marital status:      Spouse name: Not  on file    Number of children: Not on file    Years of education: Not on file    Highest education level: Not on file   Occupational History    Occupation:     Occupation:    Tobacco Use    Smoking status: Never    Smokeless tobacco: Never   Vaping Use    Vaping status: Never Used   Substance and Sexual Activity    Alcohol use: Yes     Comment: socially    Drug use: No    Sexual activity: Yes     Partners: Male   Other Topics Concern    Caffeine Concern No    Exercise No    Seat Belt No    Special Diet No    Stress Concern No    Weight Concern Yes     Comment: I think im too fat     Service Not Asked    Blood Transfusions No    Occupational Exposure Not Asked    Hobby Hazards Not Asked    Sleep Concern Not Asked    Back Care Not Asked    Bike Helmet Not Asked    Self-Exams Not Asked   Social History Narrative    Lives with partner and daughter    Son away at school    Works 9pm-5am for [employer]     Social Drivers of Health     Financial Resource Strain: Not on file   Food Insecurity: Not on file   Transportation Needs: Not on file   Physical Activity: Not on file   Stress: Not on file   Social Connections: Unknown (3/13/2021)    [facility]    Social Connections     Conversations with friends/family/neighbors per week: Not on file   Housing Stability: Low Risk  (7/8/2021)    [facility], [facility]    Housing Stability     Mortgage Payment Concerns?: Not on file     Number of Places Lived in the Last Year: Not on file     Unstable Housing?: Not on file       Current Outpatient Medications   Medication Sig Dispense Refill    albuterol 108 (90 Base) MCG/ACT Inhalation Aero Soln Inhale 2 puffs into the lungs every 6 (six) hours as needed for Wheezing or Shortness of Breath. 1 each 3    Cholecalciferol (VITAMIN D3) 1.25 MG (14021 UT) Oral Tab Take 1 tablet by mouth  once a week for 12 doses. 12 tablet 0    Abeba 500 MG Oral Cap Take 1 capsule by mouth in the morning, at noon, and at bedtime.      fluticasone propionate 50 MCG/ACT Nasal Suspension 2 sprays by Each Nare route daily. 1 each 2    valACYclovir 1 G Oral Tab Take 1 tablet (1,000 mg total) by mouth daily. 90 tablet 2       Patient has no known allergies.      Vitals:   Vitals:    11/11/24 1522   BP: 110/74   Pulse: 70   SpO2: 97%   Weight: 160 lb (72.6 kg)   Height: 5' 4\" (1.626 m)       Body mass index is 27.46 kg/m².    Physical:  General:  Alert, appropriate, no acute distress   HEENT: supple, no tonsillar erythema or exudate, no lymphadenopathy   Cardio:  RRR, no murmurs, S1, S2  Pulmonary:  Clear bilaterally, good air entry  Dermatologic:  No rashes or lesions  Neuro: CN II-XII intact, 5/5 muscle strength bilateral upper and lower extremities, normal cerebellar testing, no focal neurologic deficits       Assessment and Plan: 44-year-old female here for evaluation memory loss likely due to perimenopause.    1. Memory loss    -Memory concerns likely due to perimenopause as she also has a lower libido and she is more here than normal  - Note vitamin B12, folic acid, and thyroid levels were checked earlier this year and came back normal  - Also with a normal neurologic exam so will defer further testing or referral at this time  - Will consider hormone replacement therapy, but first to trial Abeba 500 mg three times a day  -Stop taking Abeba if not improving after 1 month, and only to take for 3 months at a time    2. Vitamin D deficiency    -Start once weekly, high-dose supplement and repeat vitamin D level in 3 months  - Vitamin D; Future        Luz Maria Hemphill DO  11/11/24  3:44 PM

## 2024-12-18 ENCOUNTER — HOSPITAL ENCOUNTER (OUTPATIENT)
Age: 44
Discharge: HOME | End: 2024-12-18
Payer: COMMERCIAL

## 2024-12-18 VITALS
HEART RATE: 93 BPM | RESPIRATION RATE: 18 BRPM | OXYGEN SATURATION: 100 % | SYSTOLIC BLOOD PRESSURE: 95 MMHG | TEMPERATURE: 98 F | DIASTOLIC BLOOD PRESSURE: 60 MMHG

## 2024-12-18 DIAGNOSIS — N76.0 BACTERIAL VAGINOSIS: ICD-10-CM

## 2024-12-18 DIAGNOSIS — B37.31 CANDIDA VAGINITIS: Primary | ICD-10-CM

## 2024-12-18 DIAGNOSIS — B96.89 BACTERIAL VAGINOSIS: ICD-10-CM

## 2024-12-18 PROCEDURE — 99213 OFFICE O/P EST LOW 20 MIN: CPT | Performed by: PHYSICIAN ASSISTANT

## 2024-12-18 PROCEDURE — 81514 NFCT DS BV&VAGINITIS DNA ALG: CPT | Performed by: PHYSICIAN ASSISTANT

## 2024-12-18 RX ORDER — FLUCONAZOLE 150 MG/1
150 TABLET ORAL
Qty: 2 TABLET | Refills: 0 | Status: SHIPPED | OUTPATIENT
Start: 2024-12-18

## 2024-12-18 NOTE — DISCHARGE INSTRUCTIONS
Your vaginal swab tested positive for yeast and bacterial vaginosis.  You are currently on appropriate regiment with Diflucan to treat yeast infection.  However, positive bacterial vaginosis requires antibiotics.  I have sent antibiotics to the pharmacy.  Take twice a day for 7 days.  Please be aware you cannot drink alcohol while on these antibiotics, you will become violently ill.     Please follow-up with your OB/GYN for reevaluation as needed.

## 2024-12-18 NOTE — ED PROVIDER NOTES
Patient Seen in: [facility]      History     Chief Complaint   Patient presents with    Eval-G     Stated Complaint: eval-g    Subjective:   HPI      Patient is a 44-year-old female, presenting to immediate care for evaluation of a cottage cheeselike vaginal discharge with vaginal order.  Onset; several days.  She was recently treated for vaginal yeast infection last month.  States previously to that was treated with antibiotics for bacterial vaginosis.  To that.  States she keeps having recurrence of vaginal yeast and bacterial vaginosis infections.  Was recommended using boric acid and tree oil.  Initially used boric acid.  Has not used both since.  No fevers.  No abdominal pain.  No urinary symptoms.  No vaginal bleeding.  History of hysterectomy.  Not concern for STIs.  Not immunocompromise    Objective:     Past Medical History:    Allergic rhinitis    Anemia    YRS AGO    Asthma (HCC)    BV (bacterial vaginosis)    Dysmenorrhea    Dyspareunia    Esophageal reflux    Fibroids    Heartburn    IBS (irritable bowel syndrome)    Menstrual suppression    Migraines    Pneumonia due to organism    years ago    Submucous uterine fibroid              Past Surgical History:   Procedure Laterality Date    Colposcopy, cervix w/upper adjacent vagina; w/biopsy(s), cervix      Hysterectomy  2024    laparoscopic hys with bilateral salpingectomy    Hysteroscopy  2023    Hysteroscopy with myomectomy, resection of a submucosal fibroid with MyoSure and an endometrial ablation with NovaSure.    Leep  2013                      Social History     Socioeconomic History    Marital status:    Occupational History    Occupation:     Occupation:    Tobacco Use    Smoking status: Never    Smokeless tobacco: Never   Vaping Use    Vaping status: Never Used   Substance and Sexual Activity    Alcohol use: Yes     Comment: socially    Drug use: No    Sexual activity: Yes      Partners: Male   Other Topics Concern    Caffeine Concern No    Exercise No    Seat Belt No    Special Diet No    Stress Concern No    Weight Concern Yes     Comment: I think im too fat    Blood Transfusions No   Social History Narrative    Lives with partner and daughter    Son away at school    Works 9pm-5am for [employer]     Social Drivers of Health      Received from [facility], [facility]    Social Connections    Received from [facility], [facility]    Housing Stability              Review of Systems   Constitutional:  Negative for fever.   Gastrointestinal:  Negative for abdominal pain, diarrhea and vomiting.   Genitourinary:  Positive for vaginal discharge. Negative for difficulty urinating, dysuria, flank pain, frequency, pelvic pain, urgency, vaginal bleeding and vaginal pain.   Musculoskeletal:  Negative for back pain.   Allergic/Immunologic: Negative for immunocompromised state.   Psychiatric/Behavioral:  Negative for confusion.        Positive for stated complaint: eval-g  Other systems are as noted in HPI.  Constitutional and vital signs reviewed.      All other systems reviewed and negative except as noted above.    Physical Exam     ED Triage Vitals [12/18/24 0925]   BP 95/60   Pulse 93   Resp 18   Temp 98 °F (36.7 °C)   Temp src Oral   SpO2 100 %   O2 Device None (Room air)       Current Vitals:   Vital Signs  BP: 95/60  Pulse: 93  Resp: 18  Temp: 98 °F (36.7 °C)  Temp src: Oral    Oxygen Therapy  SpO2: 100 %  O2 Device: None (Room air)        Physical Exam  Vitals and nursing note reviewed.   Constitutional:       General: She is not in acute distress.     Appearance: Normal appearance. She is well-developed. She is not ill-appearing, toxic-appearing or diaphoretic.   HENT:      Head: Normocephalic and atraumatic.      Mouth/Throat:      Mouth: Mucous membranes are moist.   Eyes:      General: No scleral  icterus.  Cardiovascular:      Rate and Rhythm: Normal rate.   Pulmonary:      Effort: No respiratory distress.   Abdominal:      General: There is no distension.   Genitourinary:     Vagina: Vaginal discharge present.      Comments:         Jose Goff RN @ bedside during pelvic exam. Normal external genitalia.  No ulcerations or lesions.  + white cottage cheese-like Vaginal discharge.  No blood in vaginal vault.  No ulcerations or lesions.  No CMT or adnexal tenderness                                                                                           ----------------------------------------------------------------------------    Bowel Function                                                        --------------------------------  Jose Goff RN @ bedside during pelvic exam. Normal external genitalia.  No ulcerations or lesions.  White Vaginal discharge.  + cottage cheeselike discharge. No blood in vaginal vault.  No ulcerations or lesions.  No CMT or adnexal tenderness                                    Musculoskeletal:         General: No tenderness or deformity. Normal range of motion.   Skin:     Findings: No rash.   Neurological:      General: No focal deficit present.      Mental Status: She is alert and oriented to person, place, and time.      Motor: No weakness.   Psychiatric:         Mood and Affect: Mood normal.         Behavior: Behavior normal.           ED Course     Labs Reviewed   VAGINITIS VAGINOSIS PCR PANEL               MDM     Dx: Acute Vaginitis, Initial Encounter  Dx: Candida Vaginal Infection, Initial Encounter  Symptomatic  Benign abdominal exam  Pelvic exam consistent with vaginitis - candida  Will treat for candida with oral diflucan  Vaginitis culture pending  Treatment and Testing of partners - if results positive  Discharge instructions on vaginitis - reducing/preventing  Discharge instructions on vaginal yeast infections  PCP and Gynecology follow-up as needed  Return  precautions        Medical Decision Making      Disposition and Plan     Clinical Impression:  1. Candida vaginitis         Disposition:  Discharge  12/18/2024  9:57 am    Follow-up:  Luz Maria Hepmhill DO  [address] [address] [phone]                Medications Prescribed:  Current Discharge Medication List              Supplementary Documentation:

## 2024-12-18 NOTE — ED INITIAL ASSESSMENT (HPI)
Patient arrives ambulatory with c/o cottage cheese vaginal discharge and odor. Reports same symptoms 1 week ago, took monistat OTC and discharge came back worse. Denies STD concerns.

## 2024-12-19 RX ORDER — METRONIDAZOLE 500 MG/1
500 TABLET ORAL 2 TIMES DAILY
Qty: 14 TABLET | Refills: 0 | Status: SHIPPED | OUTPATIENT
Start: 2024-12-19 | End: 2024-12-26

## 2025-02-05 ENCOUNTER — TELEMEDICINE (OUTPATIENT)
Facility: CLINIC | Age: 45
End: 2025-02-05
Payer: COMMERCIAL

## 2025-02-05 DIAGNOSIS — N95.1 MENOPAUSAL SYMPTOMS: Primary | ICD-10-CM

## 2025-02-05 PROBLEM — N93.9 ABNORMAL UTERINE BLEEDING: Status: RESOLVED | Noted: 2024-01-31 | Resolved: 2025-02-05

## 2025-02-05 PROBLEM — D25.1 INTRAMURAL UTERINE FIBROID: Status: RESOLVED | Noted: 2023-11-13 | Resolved: 2025-02-05

## 2025-02-05 PROBLEM — D25.1 INTRAMURAL LEIOMYOMA OF UTERUS: Status: RESOLVED | Noted: 2024-01-31 | Resolved: 2025-02-05

## 2025-02-05 PROBLEM — N92.0 EXCESSIVE OR FREQUENT MENSTRUATION: Status: RESOLVED | Noted: 2023-01-31 | Resolved: 2025-02-05

## 2025-02-05 PROBLEM — N92.6 IRREGULAR PERIODS: Status: RESOLVED | Noted: 2023-01-19 | Resolved: 2025-02-05

## 2025-02-05 RX ORDER — ESTRADIOL 0.03 MG/D
1 FILM, EXTENDED RELEASE TRANSDERMAL
Qty: 24 EACH | Refills: 0 | Status: SHIPPED | OUTPATIENT
Start: 2025-02-06

## 2025-02-05 NOTE — PROGRESS NOTES
CC:    Chief Complaint   Patient presents with    Menopause     Talk about her menopause and see if there is any medication she can be prescribed to help with her symptoms.       HPI: 44 year old female presenting for video visit to discuss menopausal symptoms.  Had a hysterectomy in January of 2024 due to fibroids.  Reports her memory loss has gotten worse over the last several months, and she is losing things more easily.  She is also forgetting appointments at times.   It can be hard to get out of bed as she is working a lot, but she feels more fatigued than usual.   She is not sleeping for very long stretches when she works overnight, which occurs six nights a week.   She is not having a hard time falling or staying asleep.   She is also a lot more quintanilla and irritable.   She will get hot flashes occasionally.   Her libido is lower, but not as much vaginal dryness.   Reports her memory loss feels like brain fog.   Her symptoms got worse after her hysterectomy, but it started with her irregular periods a few years ago.   She took Abeba for one month, but it made symptoms worse.   Also tried Ashwagandha without much improvement.     ROS:  General:  No fever, fatigue, no weight changes, mild hot flashes  HEENT:  Denies congestion or nasal discharge  Cardio:  No chest pain  Pulmonary:  No cough, no SOB  GI:  No N/V/D  :  No discharge, no dysuria, no polyuria, no hematuria, lower libido   Dermatologic:  No rashes  Psych: no anxiety or depression, irritability   Neuro: no headaches, no dizziness, memory loss and brain fog     Past Medical History:    Allergic rhinitis    Anemia    YRS AGO    Asthma (HCC)    BV (bacterial vaginosis)    Dysmenorrhea    Dyspareunia    Esophageal reflux    Fibroids    Heartburn    IBS (irritable bowel syndrome)    Menstrual suppression    Migraines    Pneumonia due to organism    years ago    Submucous uterine fibroid       Social History     Socioeconomic History    Marital status:       Spouse name: Not on file    Number of children: Not on file    Years of education: Not on file    Highest education level: Not on file   Occupational History    Occupation:     Occupation:    Tobacco Use    Smoking status: Never    Smokeless tobacco: Never   Vaping Use    Vaping status: Never Used   Substance and Sexual Activity    Alcohol use: Yes     Comment: socially    Drug use: No    Sexual activity: Yes     Partners: Male   Other Topics Concern    Caffeine Concern No    Exercise No    Seat Belt No    Special Diet No    Stress Concern No    Weight Concern Yes     Comment: I think im too fat     Service Not Asked    Blood Transfusions No    Occupational Exposure Not Asked    Hobby Hazards Not Asked    Sleep Concern Not Asked    Back Care Not Asked    Bike Helmet Not Asked    Self-Exams Not Asked   Social History Narrative    Lives with partner and daughter    Son away at school    Works 9pm-5am for [employer]     Food Insecurity: Not on file   Transportation Needs: Not on file   Stress: Not on file   Housing Stability: Low Risk  (7/8/2021)    [facility]    Housing Stability     Mortgage Payment Concerns?: Not on file     Number of Places Lived in the Last Year: Not on file     Unstable Housing?: Not on file       Current Outpatient Medications   Medication Sig Dispense Refill    albuterol 108 (90 Base) MCG/ACT Inhalation Aero Soln Inhale 2 puffs into the lungs every 6 (six) hours as needed for Wheezing or Shortness of Breath. 1 each 3    fluticasone propionate 50 MCG/ACT Nasal Suspension 2 sprays by Each Nare route daily. 1 each 2    valACYclovir 1 G Oral Tab Take 1 tablet (1,000 mg total) by mouth daily. 90 tablet 2    fluconazole (DIFLUCAN) 150 MG Oral Tab Take 1 tablet (150 mg total) by mouth every third day as needed. (Patient not taking: Reported on 2/5/2025) 2  tablet 0       Patient has no known allergies.        Physical:  General:  Alert, appropriate, no acute distress, A&O x 3  Pulmonary:  Speaking in clear and full sentences, no dyspnea   Psych: normal mood and affect     Assessment and Plan: 44-year-old female presenting for video visit to discuss menopausal symptoms.     1. Menopausal symptoms    -Has tried supplements without any improvement and actually worsening of her symptoms, and interested in estrogen replacement therapy  - Will start estradiol patches 0.025 mg twice weekly as directed, and no need for progesterone as she does not have a uterus  - Discussed potential risks associated with hormone replacement therapy, and she expressed understanding and acceptance of these risks  - Follow-up in 3 months or sooner if concerns  - estradiol (VIVELLE-DOT) 0.025 MG/24HR Transdermal Patch Biweekly; Place 1 patch onto the skin twice a week.  Dispense: 24 each; Refill: 0    Please note that the following visit was completed using two-way, real-time interactive audio and video communication. This has been done in good esvin to provide continuity of care in the best interest of the provider-patient relationship, due to the ongoing public health crisis/national emergency and because of restrictions of visitation. There are limitations of this visit as no physical exam could be performed. Every conscious effort was taken to allow for sufficient and adequate time. This billing was spent on reviewing labs, medications, radiology tests and decision making. Appropriate medical decision-making and tests are ordered as detailed in the plan of care above.      Luz Maria Hemphill DO  02/05/25  12:30 PM

## 2025-03-17 ENCOUNTER — APPOINTMENT (OUTPATIENT)
Dept: GENERAL RADIOLOGY | Facility: HOSPITAL | Age: 45
End: 2025-03-17
Attending: EMERGENCY MEDICINE
Payer: COMMERCIAL

## 2025-03-17 ENCOUNTER — HOSPITAL ENCOUNTER (EMERGENCY)
Facility: HOSPITAL | Age: 45
Discharge: HOME | End: 2025-03-17
Attending: EMERGENCY MEDICINE
Payer: COMMERCIAL

## 2025-03-17 VITALS
SYSTOLIC BLOOD PRESSURE: 101 MMHG | OXYGEN SATURATION: 100 % | RESPIRATION RATE: 20 BRPM | HEART RATE: 84 BPM | WEIGHT: 150 LBS | HEIGHT: 64 IN | BODY MASS INDEX: 25.61 KG/M2 | DIASTOLIC BLOOD PRESSURE: 75 MMHG | TEMPERATURE: 98 F

## 2025-03-17 DIAGNOSIS — R05.1 ACUTE COUGH: ICD-10-CM

## 2025-03-17 DIAGNOSIS — J06.9 VIRAL UPPER RESPIRATORY TRACT INFECTION: Primary | ICD-10-CM

## 2025-03-17 LAB
FLUAV + FLUBV RNA SPEC NAA+PROBE: NEGATIVE
FLUAV + FLUBV RNA SPEC NAA+PROBE: NEGATIVE
RSV RNA SPEC NAA+PROBE: NEGATIVE
SARS-COV-2 RNA RESP QL NAA+PROBE: NOT DETECTED

## 2025-03-17 PROCEDURE — 0241U SARS-COV-2/FLU A AND B/RSV BY PCR (GENEXPERT): CPT

## 2025-03-17 PROCEDURE — 0241U SARS-COV-2/FLU A AND B/RSV BY PCR (GENEXPERT): CPT | Performed by: EMERGENCY MEDICINE

## 2025-03-17 PROCEDURE — 99284 EMERGENCY DEPT VISIT MOD MDM: CPT

## 2025-03-17 PROCEDURE — 71045 X-RAY EXAM CHEST 1 VIEW: CPT | Performed by: EMERGENCY MEDICINE

## 2025-03-17 RX ORDER — KETOROLAC TROMETHAMINE 10 MG/1
10 TABLET, FILM COATED ORAL EVERY 6 HOURS PRN
Qty: 20 TABLET | Refills: 0 | Status: SHIPPED | OUTPATIENT
Start: 2025-03-17 | End: 2025-03-22

## 2025-03-17 RX ORDER — BENZONATATE 100 MG/1
100 CAPSULE ORAL 3 TIMES DAILY PRN
Qty: 30 CAPSULE | Refills: 0 | Status: SHIPPED | OUTPATIENT
Start: 2025-03-17 | End: 2025-04-16

## 2025-03-17 RX ORDER — PREDNISONE 20 MG/1
40 TABLET ORAL DAILY
Qty: 10 TABLET | Refills: 0 | Status: SHIPPED | OUTPATIENT
Start: 2025-03-17 | End: 2025-03-22

## 2025-03-17 NOTE — ED INITIAL ASSESSMENT (HPI)
Pt to the ed reporting cough x 3 weeks, congestion, and recently developed a headache the past 2-3 days  No relief with otc allergy medications

## 2025-03-17 NOTE — ED PROVIDER NOTES
Patient Seen in: [facility] Emergency Department    History     Chief Complaint   Patient presents with    Cough/URI       HPI    Patient presents to the ED complaining of a cough for the past 3 weeks with associated nasal congestion.  Also having a headache for the past [place] days.  She denies relief with over-the-counter medications.  No chest pain or other complaints.    History reviewed.   Past Medical History:    Allergic rhinitis    Anemia    YRS AGO    Asthma (HCC)    BV (bacterial vaginosis)    Dysmenorrhea    Dyspareunia    Esophageal reflux    Fibroids    Heartburn    IBS (irritable bowel syndrome)    Intramural leiomyoma of uterus    Menstrual suppression    Migraines    Pneumonia due to organism    years ago    Submucous uterine fibroid       History reviewed.   Past Surgical History:   Procedure Laterality Date    Colposcopy, cervix w/upper adjacent vagina; w/biopsy(s), cervix      Hysterectomy  2024    laparoscopic hys with bilateral salpingectomy    Hysteroscopy  2023    Hysteroscopy with myomectomy, resection of a submucosal fibroid with MyoSure and an endometrial ablation with NovaSure.    Leep  2013               Medications :  Prescriptions Prior to Admission[1]     Family History   Problem Relation Age of Onset    Hypertension Father     Other (Covid) Father         Pneumonia     Hypertension Mother     Dementia Mother     Diabetes Maternal Grandfather     Diabetes Maternal Uncle     Breast Cancer Neg     Ovarian Cancer Neg     Colon Cancer Neg        Smoking Status:   Social History     Socioeconomic History    Marital status:    Occupational History    Occupation:     Occupation:    Tobacco Use    Smoking status: Never    Smokeless tobacco: Never   Vaping Use    Vaping status: Never Used   Substance and Sexual Activity    Alcohol use: Yes     Comment: socially    Drug use: No    Sexual activity: Yes     Partners: Male   Other Topics  Concern    Caffeine Concern No    Exercise No    Seat Belt No    Special Diet No    Stress Concern No    Weight Concern Yes     Comment: I think im too fat    Blood Transfusions No       Constitutional and vital signs reviewed.      Social History and Family History elements reviewed from today, pertinent positives to the presenting problem noted.    Physical Exam     ED Triage Vitals [03/17/25 0927]   /77   Pulse 63   Resp 20   Temp 98 °F (36.7 °C)   Temp src    SpO2 98 %   O2 Device None (Room air)       All measures to prevent infection transmission during my interaction with the patient were taken. Handwashing was performed prior to and after the exam.  Stethoscope and any equipment used during my examination was cleaned with super sani-cloth germicidal wipes following the exam.     Physical Exam  Vitals and nursing note reviewed.   Constitutional:       General: She is not in acute distress.     Appearance: She is well-developed. She is not ill-appearing or toxic-appearing.   HENT:      Head: Normocephalic and atraumatic.      Nose: Congestion present. No rhinorrhea.   Eyes:      General:         Right eye: No discharge.         Left eye: No discharge.      Conjunctiva/sclera: Conjunctivae normal.   Neck:      Trachea: No tracheal deviation.   Cardiovascular:      Rate and Rhythm: Normal rate.   Pulmonary:      Effort: Pulmonary effort is normal. No respiratory distress.      Breath sounds: Normal breath sounds. No stridor. No rhonchi or rales.   Chest:      Chest wall: No tenderness.   Skin:     General: Skin is warm and dry.   Neurological:      Mental Status: She is alert and oriented to person, place, and time.   Psychiatric:         Mood and Affect: Mood normal.         Behavior: Behavior normal.         ED Course        Labs Reviewed   SARS-COV-2/FLU A AND B/RSV BY PCR (GENEXPERT) - Normal    Narrative:     This test is intended for the qualitative detection and differentiation of SARS-CoV-2,  influenza A, influenza B, and respiratory syncytial virus (RSV) viral RNA in nasopharyngeal or nares swabs from individuals suspected of respiratory viral infection consistent with COVID-19 by their healthcare provider. Signs and symptoms of respiratory viral infection due to SARS-CoV-2, influenza, and RSV can be similar.                                    Test performed using the Xpert Xpress SARS-CoV-2/FLU/RSV (real time RT-PCR)  assay on the GeneXpert instrument, "[employer] [place] [address].                   This test is being used under the Food and Drug Administration's Emergency Use Authorization.                                    The authorized Fact Sheet for Healthcare Providers for this assay is available upon request from the laboratory.       As Interpreted by me    Imaging Results Available and Reviewed while in ED: XR CHEST AP PORTABLE  (CPT=71045)    Result Date: 3/17/2025  CONCLUSION:   Negative for radiographically evident acute intrathoracic process.  Minimal tenting of the right hemidiaphragm.   elm-remote  Dictated by (CST): Kaleb Farfan MD on 3/17/2025 at 10:54 AM     Finalized by (CST): Kaleb Farfan MD on 3/17/2025 at 10:55 AM         ED Medications Administered: Medications - No data to display      MDM     Vitals:    03/17/25 0927 03/17/25 1100   BP: 116/77 101/75   Pulse: 63 84   Resp: 20    Temp: 98 °F (36.7 °C)    SpO2: 98% 100%   Weight: 68 kg    Height: 162.6 cm (5' 4\")      *I personally reviewed and interpreted all ED vitals.    Pulse Ox: 100%, Room air, Normal     Differential Diagnosis/ Diagnostic Considerations: URI, viral syndrome, pneumonia, other    Complicating Factors: The patient already has does not have any pertinent problems on file. to contribute to the complexity of this ED evaluation.    Medical Decision Making  Patient presents to the ED with likely viral symptoms.  Nondistressed on exam.  X-ray clear.  Normal vital signs and stable for discharge with supportive  care medications.    Problems Addressed:  Acute cough: acute illness or injury  Viral upper respiratory tract infection: acute illness or injury    Amount and/or Complexity of Data Reviewed  Labs: ordered. Decision-making details documented in ED Course.  Radiology: ordered and independent interpretation performed. Decision-making details documented in ED Course.    Risk  Prescription drug management.        Condition upon leaving the department: Stable    Disposition and Plan     Clinical Impression:  1. Viral upper respiratory tract infection    2. Acute cough        Disposition:  Discharge    Follow-up:  Luz Maria Hemphill DO  [address] [address]  [phone]    Schedule an appointment as soon as possible for a visit in 3 day(s)        Medications Prescribed:  Discharge Medication List as of 3/17/2025 11:09 AM        START taking these medications    Details   predniSONE 20 MG Oral Tab Take 2 tablets (40 mg total) by mouth daily for 5 days., Normal, Disp-10 tablet, R-0      benzonatate 100 MG Oral Cap Take 1 capsule (100 mg total) by mouth 3 (three) times daily as needed for cough., Normal, Disp-30 capsule, R-0      Ketorolac Tromethamine 10 MG Oral Tab Take 1 tablet (10 mg total) by mouth every 6 (six) hours as needed for Pain., Normal, Disp-20 tablet, R-0                              [1] (Not in a hospital admission)

## 2025-04-11 ENCOUNTER — OFFICE VISIT (OUTPATIENT)
Facility: CLINIC | Age: 45
End: 2025-04-11
Payer: COMMERCIAL

## 2025-04-11 VITALS
DIASTOLIC BLOOD PRESSURE: 72 MMHG | SYSTOLIC BLOOD PRESSURE: 116 MMHG | BODY MASS INDEX: 25.27 KG/M2 | WEIGHT: 148 LBS | HEIGHT: 64 IN | OXYGEN SATURATION: 99 % | HEART RATE: 74 BPM

## 2025-04-11 DIAGNOSIS — R22.32 AXILLARY LUMP, LEFT: ICD-10-CM

## 2025-04-11 DIAGNOSIS — Z12.11 COLON CANCER SCREENING: ICD-10-CM

## 2025-04-11 DIAGNOSIS — Z00.00 ENCOUNTER FOR ROUTINE ADULT HEALTH EXAMINATION WITHOUT ABNORMAL FINDINGS: Primary | ICD-10-CM

## 2025-04-11 DIAGNOSIS — Z86.19 HISTORY OF HERPES GENITALIS: ICD-10-CM

## 2025-04-11 DIAGNOSIS — N95.1 MENOPAUSAL SYMPTOMS: ICD-10-CM

## 2025-04-11 RX ORDER — VALACYCLOVIR HYDROCHLORIDE 1 G/1
1000 TABLET, FILM COATED ORAL DAILY
Qty: 90 TABLET | Refills: 2 | Status: SHIPPED | OUTPATIENT
Start: 2025-04-11

## 2025-04-11 NOTE — PROGRESS NOTES
HPI:   Sarah Sawyer is a 44 year old female who presents for a complete physical exam.     Has been on estradiol 0.025 mg patches since February, but was not noticing any difference on them so waiting to start the next dose. She has been more fatigued and has more breast pain since stopping it. Also noticing more joint pain and aches since stopping it. Has not noticed much improvement in her brain fog yet.   Has also been on compounded Semaglutide since last May, and tolerating it well overall with good results.   Has had a lump in her left armpit for a few months that is painful and not resolving.     Last pap: 2020 and normal-no longer needed due to hysterectomy   Last mammogram: 10/2024 and normal    Menses: None since hysterectomy 1/2024  Contraception:  Hysterectomy   Previous colonoscopy: Never had one before    History of STD's: HSV  History of intimate partner violence: None  Family hx of breast, ovarian, cervical or colon CA: None  Diet and exercise: Eats 1-2 times a day on average, and does tend to eat restaurant foods too often. She works overnight and so her eating habits are off. Tries to drink a lot of water. Not exercising currently.   Immunizations:  Tdap: 2018, Pneumo: Declines, Covid: Completed 2 doses    REVIEW OF SYSTEMS:   GENERAL: feels well otherwise   SKIN: denies any unusual skin lesions  EYES: no vision problems  BREAST: no lumps or masses, no nipple discharge   LUNGS: denies shortness of breath  CARDIOVASCULAR: denies chest pain  GI: denies abdominal pain, increased bloating, constipation but no diarrhea, no hematochezia  : denies dysuria, vaginal discharge or itching  NEURO: denies headaches  PSYCHE: denies depression or anxiety          Current Medications[1]  Allergies[2]   Past Medical History[3]   Past Surgical History[4]   Family History[5]   Social History:   Short Social Hx on File[6]    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 Children: 2 (26  and 23).         EXAM:     Wt Readings from Last 6 Encounters:   04/11/25 148 lb (67.1 kg)   03/17/25 150 lb (68 kg)   11/11/24 160 lb (72.6 kg)   11/05/24 163 lb (73.9 kg)   09/12/24 163 lb 12.8 oz (74.3 kg)   08/01/24 164 lb (74.4 kg)     Body mass index is 25.4 kg/m².   /72   Pulse 74   Ht 5' 4\" (1.626 m)   Wt 148 lb (67.1 kg)   LMP 01/07/2024 (Approximate)   SpO2 99%   BMI 25.40 kg/m²     GENERAL: well developed, well nourished, in no apparent distress   SKIN: no rashes, no suspicious lesions  HEENT: atraumatic, normocephalic, throat clear; normal dentition  EYES: PERRLA, EOMI, conjunctiva are clear  NECK: supple, no adenopathy or thyroid masses   BREAST: no dominant or suspicious mass, no nipple discharge, left axillary tender lump but no erythema, warmth, or drainage   LUNGS: clear to auscultation  CARDIO: RRR without murmur  GI: good bowel sounds, no masses, HSM or tenderness  : deferred, not due for pap smear and no concerns   EXTREMITIES: no edema    Cholesterol, Total (mg/dL)   Date Value   04/10/2024 247 (H)   12/18/2021 272 (H)   02/28/2020 209 (H)   04/02/2019 206 (H)     HDL Cholesterol (mg/dL)   Date Value   04/10/2024 83 (H)   12/18/2021 89 (H)   02/28/2020 88 (H)   04/02/2019 71 (H)     LDL Cholesterol (mg/dL)   Date Value   04/10/2024 153 (H)   12/18/2021 173 (H)   02/28/2020 112 (H)   04/02/2019 124 (H)      ASSESSMENT AND PLAN:   Sarah Sawyer is a 44 year old female who presents for a complete physical exam.  Encounter Diagnoses   Name Primary?    Encounter for routine adult health examination without abnormal findings Yes    Colon cancer screening     History of herpes genitalis     Axillary lump, left     Menopausal symptoms      Orders Placed This Encounter   Procedures    CBC With Differential With Platelet    Comp Metabolic Panel (14)    Hemoglobin A1C    Lipid Panel     Continue Valtrex as needed for genital HSV flareups.  Will check axillary ultrasound and diagnostic left  breast mammogram for further evaluation of left arm but lump that is painful.  Start estradiol patches at a higher dose of 0.0375 mg twice weekly for menopausal symptoms, and will notify me in the next 3 months how she is doing.  Schedule follow-up visit at that time.  Understands risks associated with hormone replacement therapy, and expressed understanding and acceptance of these risks.    Discussed with patient the following:  -Monthly breast self-exam  -Breast cancer screening/mammograms and clinical breast exams  -Cervical cancer screening/pap smears  -Colon cancer screening/colonoscopy  -Adequate calcium and Vitamin D intake to prevent osteoporosis  -Healthy diet including adequate intake of vegetables and fruits, appropriate portion sizes, minimizing highly concentrated carbohydrate foods  -Exercising 30 minutes a day most days of the week   -Diabetes screening which she desires  -Cholesterol screening which she desires  -Recommendation for yearly influenza vaccine  -Need for Tdap once as an adult and Td booster every 10 years  -Need for Zoster vaccine for patients >= 50  -Contraception: Hysterectomy  -STI screening (GC/Chlamydia/HIV): Not indicated  -Hepatitis C screening for all adults between the ages of 18 and 79: Checked and negative       All questions were answered during the visit and the patient verbalizes understanding. She will return in 3 months to follow-up on HRT, one year for next WWE or sooner as needed.    Meds & Refills for this Visit:  Requested Prescriptions     Signed Prescriptions Disp Refills    valACYclovir 1 G Oral Tab 90 tablet 2     Sig: Take 1 tablet (1,000 mg total) by mouth daily.       Imaging & Consults:  OP REFERRAL TO [facility] TELEPHONE COLON SCREEN  CINTIA KAYODE 2D+3D DIAGNOSTIC CINTIA LEFT (CPT=77065/85840)  US AXILLARY LEFT (KBC=37263)    Luz Maria Hemphill DO  4/11/2025  12:13 PM         [1]   Current Outpatient Medications   Medication Sig Dispense Refill    SEMAGLUTIDE-WEIGHT  MANAGEMENT SC Inject into the skin once a week.      valACYclovir 1 G Oral Tab Take 1 tablet (1,000 mg total) by mouth daily. 90 tablet 2    Estradiol (VIVELLE-DOT) 0.0375 MG/24HR Transdermal Patch Biweekly Place 1 patch onto the skin twice a week. 24 patch 0    albuterol 108 (90 Base) MCG/ACT Inhalation Aero Soln Inhale 2 puffs into the lungs every 6 (six) hours as needed for Wheezing or Shortness of Breath. 1 each 3    fluticasone propionate 50 MCG/ACT Nasal Suspension 2 sprays by Each Nare route daily. 1 each 2    fluconazole (DIFLUCAN) 150 MG Oral Tab Take 1 tablet (150 mg total) by mouth every third day as needed. (Patient not taking: Reported on 2025) 2 tablet 0   [2] No Known Allergies  [3]   Past Medical History:   Allergic rhinitis    Anemia    YRS AGO    Asthma (HCC)    BV (bacterial vaginosis)    Dysmenorrhea    Dyspareunia    Esophageal reflux    Fibroids    Heartburn    IBS (irritable bowel syndrome)    Intramural leiomyoma of uterus    Menstrual suppression    Migraines    Pneumonia due to organism    years ago    Submucous uterine fibroid   [4]   Past Surgical History:  Procedure Laterality Date    Colposcopy, cervix w/upper adjacent vagina; w/biopsy(s), cervix      Hysterectomy  2024    laparoscopic hys with bilateral salpingectomy    Hysteroscopy  2023    Hysteroscopy with myomectomy, resection of a submucosal fibroid with MyoSure and an endometrial ablation with NovaSure.    Leep  2013         [5]   Family History  Problem Relation Age of Onset    Hypertension Father     Other (Covid) Father         Pneumonia     Hypertension Mother     Dementia Mother     Diabetes Maternal Grandfather     Diabetes Maternal Uncle     Breast Cancer Neg     Ovarian Cancer Neg     Colon Cancer Neg    [6]   Social History  Socioeconomic History    Marital status:    Occupational History    Occupation:     Occupation:    Tobacco Use    Smoking status: Never     Smokeless tobacco: Never   Vaping Use    Vaping status: Never Used   Substance and Sexual Activity    Alcohol use: Yes     Alcohol/week: 2.0 standard drinks of alcohol     Types: 2 Glasses of wine per week     Comment: socially    Drug use: No    Sexual activity: Yes     Partners: Male     Birth control/protection: Hysterectomy   Other Topics Concern    Caffeine Concern No    Exercise No    Seat Belt No    Special Diet No    Stress Concern No    Weight Concern No    Blood Transfusions No   Social History Narrative    Lives with partner and daughter    Son away at school    Works 9pm-5am for [employer]     Social Drivers of Health      Received from [facility]    Housing Stability

## 2025-04-15 ENCOUNTER — LAB ENCOUNTER (OUTPATIENT)
Dept: LAB | Age: 45
End: 2025-04-15
Attending: FAMILY MEDICINE
Payer: COMMERCIAL

## 2025-04-15 DIAGNOSIS — Z00.00 ENCOUNTER FOR ROUTINE ADULT HEALTH EXAMINATION WITHOUT ABNORMAL FINDINGS: ICD-10-CM

## 2025-04-15 DIAGNOSIS — E55.9 VITAMIN D DEFICIENCY: ICD-10-CM

## 2025-04-15 LAB
ALBUMIN SERPL-MCNC: 4.6 G/DL (ref 3.2–4.8)
ALBUMIN/GLOB SERPL: 1.7 {RATIO} (ref 1–2)
ALP LIVER SERPL-CCNC: 72 U/L (ref 37–98)
ALT SERPL-CCNC: 10 U/L (ref 10–49)
ANION GAP SERPL CALC-SCNC: 11 MMOL/L (ref 0–18)
AST SERPL-CCNC: 17 U/L (ref ?–34)
BASOPHILS # BLD AUTO: 0.03 X10(3) UL (ref 0–0.2)
BASOPHILS NFR BLD AUTO: 0.5 %
BILIRUB SERPL-MCNC: 0.7 MG/DL (ref 0.3–1.2)
BUN BLD-MCNC: 8 MG/DL (ref 9–23)
BUN/CREAT SERPL: 9 (ref 10–20)
CALCIUM BLD-MCNC: 9.5 MG/DL (ref 8.7–10.4)
CHLORIDE SERPL-SCNC: 104 MMOL/L (ref 98–112)
CHOLEST SERPL-MCNC: 233 MG/DL (ref ?–200)
CO2 SERPL-SCNC: 25 MMOL/L (ref 21–32)
CREAT BLD-MCNC: 0.89 MG/DL (ref 0.55–1.02)
DEPRECATED RDW RBC AUTO: 47.8 FL (ref 35.1–46.3)
EGFRCR SERPLBLD CKD-EPI 2021: 82 ML/MIN/1.73M2 (ref 60–?)
EOSINOPHIL # BLD AUTO: 0.11 X10(3) UL (ref 0–0.7)
EOSINOPHIL NFR BLD AUTO: 1.8 %
ERYTHROCYTE [DISTWIDTH] IN BLOOD BY AUTOMATED COUNT: 13.6 % (ref 11–15)
EST. AVERAGE GLUCOSE BLD GHB EST-MCNC: 103 MG/DL (ref 68–126)
FASTING PATIENT LIPID ANSWER: YES
FASTING STATUS PATIENT QL REPORTED: YES
GLOBULIN PLAS-MCNC: 2.7 G/DL (ref 2–3.5)
GLUCOSE BLD-MCNC: 91 MG/DL (ref 70–99)
HBA1C MFR BLD: 5.2 % (ref ?–5.7)
HCT VFR BLD AUTO: 41.7 % (ref 35–48)
HDLC SERPL-MCNC: 72 MG/DL (ref 40–59)
HGB BLD-MCNC: 14 G/DL (ref 12–16)
IMM GRANULOCYTES # BLD AUTO: 0.02 X10(3) UL (ref 0–1)
IMM GRANULOCYTES NFR BLD: 0.3 %
LDLC SERPL CALC-MCNC: 151 MG/DL (ref ?–100)
LYMPHOCYTES # BLD AUTO: 1.06 X10(3) UL (ref 1–4)
LYMPHOCYTES NFR BLD AUTO: 16.9 %
MCH RBC QN AUTO: 32.2 PG (ref 26–34)
MCHC RBC AUTO-ENTMCNC: 33.6 G/DL (ref 31–37)
MCV RBC AUTO: 95.9 FL (ref 80–100)
MONOCYTES # BLD AUTO: 0.53 X10(3) UL (ref 0.1–1)
MONOCYTES NFR BLD AUTO: 8.5 %
NEUTROPHILS # BLD AUTO: 4.52 X10 (3) UL (ref 1.5–7.7)
NEUTROPHILS # BLD AUTO: 4.52 X10(3) UL (ref 1.5–7.7)
NEUTROPHILS NFR BLD AUTO: 72 %
NONHDLC SERPL-MCNC: 161 MG/DL (ref ?–130)
OSMOLALITY SERPL CALC.SUM OF ELEC: 288 MOSM/KG (ref 275–295)
PLATELET # BLD AUTO: 354 10(3)UL (ref 150–450)
POTASSIUM SERPL-SCNC: 4 MMOL/L (ref 3.5–5.1)
PROT SERPL-MCNC: 7.3 G/DL (ref 5.7–8.2)
RBC # BLD AUTO: 4.35 X10(6)UL (ref 3.8–5.3)
SODIUM SERPL-SCNC: 140 MMOL/L (ref 136–145)
TRIGL SERPL-MCNC: 58 MG/DL (ref 30–149)
VIT D+METAB SERPL-MCNC: 28.2 NG/ML (ref 30–100)
VLDLC SERPL CALC-MCNC: 11 MG/DL (ref 0–30)
WBC # BLD AUTO: 6.3 X10(3) UL (ref 4–11)

## 2025-04-15 PROCEDURE — 80053 COMPREHEN METABOLIC PANEL: CPT | Performed by: FAMILY MEDICINE

## 2025-04-15 PROCEDURE — 82306 VITAMIN D 25 HYDROXY: CPT | Performed by: FAMILY MEDICINE

## 2025-04-15 PROCEDURE — 80061 LIPID PANEL: CPT | Performed by: FAMILY MEDICINE

## 2025-04-15 PROCEDURE — 85025 COMPLETE CBC W/AUTO DIFF WBC: CPT | Performed by: FAMILY MEDICINE

## 2025-04-15 PROCEDURE — 83036 HEMOGLOBIN GLYCOSYLATED A1C: CPT | Performed by: FAMILY MEDICINE

## 2025-04-16 ENCOUNTER — HOSPITAL ENCOUNTER (OUTPATIENT)
Dept: MAMMOGRAPHY | Facility: HOSPITAL | Age: 45
Discharge: HOME | End: 2025-04-16
Attending: FAMILY MEDICINE
Payer: COMMERCIAL

## 2025-04-16 ENCOUNTER — HOSPITAL ENCOUNTER (OUTPATIENT)
Dept: ULTRASOUND IMAGING | Facility: HOSPITAL | Age: 45
Discharge: HOME | End: 2025-04-16
Attending: FAMILY MEDICINE
Payer: COMMERCIAL

## 2025-04-16 DIAGNOSIS — R22.32 AXILLARY LUMP, LEFT: ICD-10-CM

## 2025-04-16 PROCEDURE — 77065 DX MAMMO INCL CAD UNI: CPT | Performed by: FAMILY MEDICINE

## 2025-04-16 PROCEDURE — 77061 BREAST TOMOSYNTHESIS UNI: CPT | Performed by: FAMILY MEDICINE

## 2025-04-16 PROCEDURE — 76642 ULTRASOUND BREAST LIMITED: CPT | Performed by: FAMILY MEDICINE

## 2025-05-15 ENCOUNTER — OFFICE VISIT (OUTPATIENT)
Dept: OBGYN CLINIC | Facility: CLINIC | Age: 45
End: 2025-05-15
Payer: COMMERCIAL

## 2025-05-15 VITALS
SYSTOLIC BLOOD PRESSURE: 118 MMHG | BODY MASS INDEX: 25.42 KG/M2 | WEIGHT: 148.88 LBS | HEIGHT: 64 IN | DIASTOLIC BLOOD PRESSURE: 70 MMHG

## 2025-05-15 DIAGNOSIS — N76.0 VAGINITIS AND VULVOVAGINITIS: Primary | ICD-10-CM

## 2025-05-15 PROCEDURE — 3074F SYST BP LT 130 MM HG: CPT | Performed by: OBSTETRICS & GYNECOLOGY

## 2025-05-15 PROCEDURE — 3008F BODY MASS INDEX DOCD: CPT | Performed by: OBSTETRICS & GYNECOLOGY

## 2025-05-15 PROCEDURE — 99213 OFFICE O/P EST LOW 20 MIN: CPT | Performed by: OBSTETRICS & GYNECOLOGY

## 2025-05-15 PROCEDURE — 3078F DIAST BP <80 MM HG: CPT | Performed by: OBSTETRICS & GYNECOLOGY

## 2025-05-15 RX ORDER — ESTRADIOL 0.04 MG/D
1 PATCH, EXTENDED RELEASE TRANSDERMAL
Qty: 24 PATCH | Refills: 0 | Status: SHIPPED | OUTPATIENT
Start: 2025-05-15

## 2025-05-15 NOTE — PROGRESS NOTES
CC: Patient is here for vaginal discharge with odor.     HPI: Patient is a 44 year old  for fishy vaginal odor with discharge. She has been treated by on line MD over 5 x in the past year.  Patient has had a hysterectomy.     Patient's last menstrual period was 2024 (approximate).    OB History    Para Term  AB Living   2 2 1 1  2   SAB IAB Ectopic Multiple Live Births       2      # Outcome Date GA Lbr Cuco/2nd Weight Sex Type Anes PTL Lv   2  01 36w0d   F NORMAL SPONT  Y NICOLE      Complications: Abruptio Placenta   1 Term 98 39w5d   M NORMAL SPONT  N NICOLE       GYN hx:       LPS:        Past Medical History[1]  Past Surgical History[2]  Allergies[3]  Family History[4]  Social History     Socioeconomic History    Marital status:      Spouse name: Not on file    Number of children: Not on file    Years of education: Not on file    Highest education level: Not on file   Occupational History    Occupation:     Occupation:    Tobacco Use    Smoking status: Never    Smokeless tobacco: Never   Vaping Use    Vaping status: Never Used   Substance and Sexual Activity    Alcohol use: Yes     Alcohol/week: 2.0 standard drinks of alcohol     Types: 2 Glasses of wine per week     Comment: socially    Drug use: No    Sexual activity: Yes     Partners: Male     Birth control/protection: Hysterectomy   Other Topics Concern    Caffeine Concern No    Exercise No    Seat Belt No    Special Diet No    Stress Concern No    Weight Concern No     Service Not Asked    Blood Transfusions No    Occupational Exposure Not Asked    Hobby Hazards Not Asked    Sleep Concern Not Asked    Back Care Not Asked    Bike Helmet Not Asked    Self-Exams Not Asked   Social History Narrative    Lives with partner and daughter    Son away at school    Works 9pm-5am for [employer]     Social Drivers of Health     Food Insecurity: Not on file   Transportation Needs: Not on  file   Stress: Not on file   Housing Stability: Low Risk  (7/8/2021)    Received from [facility]    Housing Stability     Mortgage Payment Concerns?: Not on file     Number of Places Lived in the Last Year: Not on file     Unstable Housing?: Not on file       Medications reviewed. See active list.     /70   Ht 64\"   Wt 148 lb 14.4 oz (67.5 kg)   LMP 01/07/2024 (Approximate)   BMI 25.56 kg/m²       Exam:   GENERAL: well developed, well nourished, in no apparent distress  ABDOMEN: Soft, non distended; non tender, no masses.  Liver and spleen non-tender, no enlargement. No palpable hernias  GYNE/:  External Genitalia: Normal appearing, no lesions, normal hair distribution   Urethral meatus appear wnl, no abnormal discharge or lesions noted.   Bladder: well supported, urethra wnl, no palpable tenderness or masses, no discharge  Vagina: normal pink mucosa, no lesions, + large amount of clumpy discharge in vagina, removed.       A/P: Patient is 44 year old female     1. Vaginitis and vulvovaginitis    VIkor swab sent.     Carolyne Gavin MD                [1]   Past Medical History:   Allergic rhinitis    Asthma (HCC)    Dyspareunia    Esophageal reflux    Heartburn    IBS (irritable bowel syndrome)    Migraines   [2]   Past Surgical History:  Procedure Laterality Date    Colposcopy, cervix w/upper adjacent vagina; w/biopsy(s), cervix      Hysterectomy  01/31/2024    laparoscopic hys with bilateral salpingectomy    Hysteroscopy  04/20/2023    Hysteroscopy with myomectomy, resection of a submucosal fibroid with MyoSure and an endometrial ablation with NovaSure.    Leep  06/2013   [3] No Known Allergies  [4]   Family History  Problem Relation Age of Onset    Hypertension Father     Other (Covid) Father         Pneumonia     Hypertension Mother     Dementia Mother     Diabetes Maternal Grandfather     Diabetes Maternal Uncle     Breast Cancer Neg     Ovarian Cancer Neg     Colon  Cancer Neg

## 2025-05-20 ENCOUNTER — TELEPHONE (OUTPATIENT)
Dept: OBGYN CLINIC | Facility: CLINIC | Age: 45
End: 2025-05-20

## 2025-05-20 RX ORDER — METRONIDAZOLE 500 MG/1
500 TABLET ORAL 2 TIMES DAILY
Qty: 14 TABLET | Refills: 0 | Status: SHIPPED | OUTPATIENT
Start: 2025-05-20

## 2025-05-20 RX ORDER — AZITHROMYCIN 500 MG/1
1000 TABLET, FILM COATED ORAL ONCE
Qty: 2 TABLET | Refills: 0 | Status: SHIPPED | OUTPATIENT
Start: 2025-05-20 | End: 2025-05-20

## 2025-05-20 NOTE — TELEPHONE ENCOUNTER
Vikor- BV, prevotella, Ureaplasma    Treatment- Metronidazole and Azithromycin    Called pt informed of Vikor results/treatment, agrees.

## 2025-06-02 ENCOUNTER — PATIENT MESSAGE (OUTPATIENT)
Dept: OBGYN CLINIC | Facility: CLINIC | Age: 45
End: 2025-06-02

## 2025-06-02 NOTE — TELEPHONE ENCOUNTER
Encounter on 5/20/25:     Vikor- BV, prevotella, Ureaplasma     Treatment- Metronidazole and Azithromycin

## 2025-06-04 RX ORDER — DOXYCYCLINE HYCLATE 100 MG
100 TABLET ORAL 2 TIMES DAILY
Qty: 20 TABLET | Refills: 0 | Status: SHIPPED | OUTPATIENT
Start: 2025-06-04 | End: 2025-06-14

## 2025-06-04 RX ORDER — MOXIFLOXACIN HYDROCHLORIDE 400 MG/1
400 TABLET ORAL DAILY
Qty: 10 TABLET | Refills: 0 | Status: SHIPPED | OUTPATIENT
Start: 2025-06-04 | End: 2025-06-14

## 2025-08-19 RX ORDER — LOVASTATIN 40 MG/1
1 TABLET ORAL
Qty: 24 PATCH | Refills: 0 | Status: SHIPPED | OUTPATIENT
Start: 2025-08-19

## 2025-08-26 ENCOUNTER — OFFICE VISIT (OUTPATIENT)
Facility: CLINIC | Age: 45
End: 2025-08-26

## 2025-08-26 VITALS
BODY MASS INDEX: 24.92 KG/M2 | HEIGHT: 64 IN | HEART RATE: 74 BPM | WEIGHT: 146 LBS | OXYGEN SATURATION: 99 % | DIASTOLIC BLOOD PRESSURE: 74 MMHG | SYSTOLIC BLOOD PRESSURE: 118 MMHG

## 2025-08-26 DIAGNOSIS — N95.1 PERIMENOPAUSAL VASOMOTOR SYMPTOMS: Primary | ICD-10-CM

## 2025-08-26 DIAGNOSIS — Z02.89 ENCOUNTER FOR PHYSICAL EXAMINATION RELATED TO EMPLOYMENT: ICD-10-CM

## 2025-08-26 DIAGNOSIS — H53.8 BLURRY VISION: ICD-10-CM

## 2025-08-26 PROCEDURE — 3078F DIAST BP <80 MM HG: CPT | Performed by: FAMILY MEDICINE

## 2025-08-26 PROCEDURE — 3074F SYST BP LT 130 MM HG: CPT | Performed by: FAMILY MEDICINE

## 2025-08-26 PROCEDURE — 99214 OFFICE O/P EST MOD 30 MIN: CPT | Performed by: FAMILY MEDICINE

## 2025-08-26 PROCEDURE — 3008F BODY MASS INDEX DOCD: CPT | Performed by: FAMILY MEDICINE

## 2025-08-26 RX ORDER — ESTRADIOL 0.05 MG/D
1 PATCH, EXTENDED RELEASE TRANSDERMAL
Qty: 24 PATCH | Refills: 0 | Status: SHIPPED | OUTPATIENT
Start: 2025-08-28

## (undated) DEVICE — BRAVO SOLUTION PH 1.07

## (undated) DEVICE — CAPSULE BRAVO PH

## (undated) DEVICE — PAD POS 36IN DISP SURGYPAD

## (undated) DEVICE — AIRSEAL TRI-LUMEN LILTERED TUBE SET: Brand: AIRSEAL

## (undated) DEVICE — SUTURE MCRYL SZ 5-0 L18IN ABSRB UD L19MM PS-2

## (undated) DEVICE — PAD PREP M ISO ALC 2 PLY NONWOVEN SFT ABSRB

## (undated) DEVICE — CATHETER,URETHRAL,REDRUBBER,STRL,12FR: Brand: MEDLINE INDUSTRIES, INC.

## (undated) DEVICE — OR TOWEL, 17" X 26" STERILE, BLUE: Brand: PREMIERPRO

## (undated) DEVICE — SUTURE MCRYL SZ 4-0 L18IN ABSRB UD L19MM PS-2

## (undated) DEVICE — GAMMEX® PI HYBRID SIZE 6.5, STERILE POWDER-FREE SURGICAL GLOVE, POLYISOPRENE AND NEOPRENE BLEND: Brand: GAMMEX

## (undated) DEVICE — DISPOSABLE SUCTION/IRRIGATOR TUBE SET: Brand: AHTO

## (undated) DEVICE — DRAPE,UNDRBUT,WHT GRAD PCH,CAPPORT,20/CS: Brand: MEDLINE

## (undated) DEVICE — TROCAR: Brand: KII FIOS FIRST ENTRY

## (undated) DEVICE — PDS II VLT 0 107CM AG ST3: Brand: ENDOLOOP

## (undated) DEVICE — FORCEP RADIAL JAW 4

## (undated) DEVICE — VIOLET BRAIDED (POLYGLACTIN 910), SYNTHETIC ABSORBABLE SUTURE: Brand: COATED VICRYL

## (undated) DEVICE — DRAPE LAP SMS T SHP FAB REINF FLAME

## (undated) DEVICE — JELLY LUB 2OZ GREASELESS FLIP TOP DISP

## (undated) DEVICE — Device

## (undated) DEVICE — BLUNT TIP LAPAROSCOPIC SEALER/DIVIDER NANO-COATED: Brand: LIGASURE

## (undated) DEVICE — SOLUTION IRRIG 1000ML 0.9% NACL USP BTL

## (undated) DEVICE — ENDOSCOPY PACK UPPER: Brand: MEDLINE INDUSTRIES, INC.

## (undated) DEVICE — 1016 S-DRAPE IRRIG POUCH 10/BOX: Brand: STERI-DRAPE™

## (undated) DEVICE — STERILE H2O FOR IRRIG .

## (undated) DEVICE — SOLUTION  .9 3000ML

## (undated) DEVICE — SET IRRIG L96IN POST OP W/ NVENT 2ND PIERCING

## (undated) DEVICE — TROCAR: Brand: KII® SLEEVE

## (undated) DEVICE — ADHESIVE SKIN TOP FOR WND CLSR DERMBND ADV

## (undated) DEVICE — VCARE LARGE UTERINE MANIPULATOR: Brand: VCARE LARGE

## (undated) DEVICE — WOLF INFLOW HYSTER

## (undated) DEVICE — SET TUBI Y FL CNTRL INFL/OTFL

## (undated) DEVICE — UTERINE ABLATOR NOVASURE

## (undated) DEVICE — NDLCTR: FOAM/ADHESIVE 10CT 96/CS: Brand: MEDICAL ACTION INDUSTRIES

## (undated) DEVICE — MYOSURE SINGLE USE SEAL SET

## (undated) DEVICE — MEDI-VAC NON-CONDUCTIVE SUCTION TUBING: Brand: CARDINAL HEALTH

## (undated) DEVICE — HYSTEROSCOPY: Brand: MEDLINE INDUSTRIES, INC.

## (undated) DEVICE — SUTURE PDS II SZ 0 L36IN ABSRB VLT L36MM CT-1

## (undated) DEVICE — DEVICE SPEC RTRVL MYOSURE

## (undated) DEVICE — POWDER HEMSTAT 3GM OXIDIZED REGENERATED CELOS

## (undated) DEVICE — DRAPE SHEET LARGE 76X55

## (undated) DEVICE — APPLICATOR ENDOSCP FOR ADJUNCTIVE HEMSTAS

## (undated) DEVICE — LAPAROSCOPY: Brand: MEDLINE INDUSTRIES, INC.

## (undated) DEVICE — AIRSEAL 5 MM ACCESS PORT AND LOW PROFILE OBTURATOR WITH BLADELESS OPTICAL TIP, 120 MM LENGTH: Brand: AIRSEAL

## (undated) DEVICE — DRAPE SHEET LG

## (undated) DEVICE — Device: Brand: DEFENDO AIR/WATER/SUCTION AND BIOPSY VALVE

## (undated) DEVICE — STERILE POLYISOPRENE POWDER-FREE SURGICAL GLOVES WITH EMOLLIENT COATING: Brand: PROTEXIS

## (undated) DEVICE — 3M™ STERI-DRAPE™ INSTRUMENT POUCH 1018L: Brand: STERI-DRAPE™

## (undated) DEVICE — SOCK CNSTR 4IN TNPSL UNV SPEC

## (undated) DEVICE — ENCORE® LATEX MICRO SIZE 6.5, STERILE LATEX POWDER-FREE SURGICAL GLOVE: Brand: ENCORE

## (undated) DEVICE — INSUFFLATION NEEDLE TO ESTABLISH PNEUMOPERITONEUM.: Brand: INSUFFLATION NEEDLE

## (undated) DEVICE — BRAVO SOLUTION PH 7.01

## (undated) DEVICE — YANKAUER,BULB TIP,W/O VENT,RIGID,STERILE: Brand: MEDLINE

## (undated) DEVICE — SUCTION CANISTER, 3000CC,SAFELINER: Brand: DEROYAL

## (undated) DEVICE — ELECTRODE LAP L33CM DIA5MM PTFE L WRE JAW

## (undated) NOTE — MR AVS SNAPSHOT
After Visit Summary   5/20/2020    Sherif Sloan    MRN: [id number]           Visit Information     Date & Time  5/20/2020  2:30 PM Provider  Margaret Sethi, [address] [facility], [facility] Dept.  Phone  [phone] [facility] now offers Video Visits through [address] for adult and pediatric patients. Video Visits are available Monday - Friday for many common conditions such as allergies, colds, cough, fever, rash, sore throat, headache and pink eye.   The cost for a Video Vi [address]   Monday – Friday  4:00 pm – 10:00 pm   Saturday – Sunday  10:00 am – 4:00 pm  WALK-IN CARE  Emergency Medicine Providers  Conditions needing urgent attention, but are   non-life-threatening.     Also available by appointment Average cost  $120*

## (undated) NOTE — LETTER
24    RE: Sarah Sawyer    : 1980    To Whom It May Concern:    Our patient, Sarah Sawyer,      __X__Has received treatment in our office on __3/16/24_________________________.        _____Has been hospitalized on the following dates ______________________.       _____ Has been ill and unable to work from _____________________________.        _X__ May resume work / school on ___3/18/24 with no restrictions______.      _____ May resume work / school with the following restrictions ______________    __________________________________________________________.      _____ May participate in physical education.      _____ May participate in a prenatal exercise class / yoga class.      _____ Patient is pregnant with a due date of Estimated Date of Delivery: None noted.      _____ Other _____________________________________________________     __________________________________________________________       Kierra German, DO

## (undated) NOTE — LETTER
11/7/2017          To Whom It May Concern:    Alex Hwang is currently under my medical care and needs to be excused from work on Tuesday, November 14 for a medical procedure. She can return to work on November  15 with no restrictions.     If you requi

## (undated) NOTE — LETTER
ASTHMA ACTION PLAN for Sarah Sawyer     : 1980     Date: 24  Doctor:  Luz Maria Hemphill DO  Phone for doctor or clinic: [facility], [address] [address] [phone]      ACT Score: 24    ACT Goal: 20 or greater    Call your provider if you require your rescue/quick reliever medication more than 2-3 times in a 24 hour period.    If you require your rescue inhaler/medication more than 2-3 times weekly, your asthma may not be under proper control and you should seek medical attention.    *Quick Relievers are Xopenex and Albuterol*    You can use the colors of a traffic light to help learn about your asthma medicines.  Therapy Range       1. Green - Go! % of Personal Best Peak Flow   Use controller medicine.   Breathing is good  No cough or wheeze  Can work and play Medicine How much to take When to take it    Medications       Sympathomimetics Instructions     albuterol 108 (90 Base) MCG/ACT Inhalation Aero Soln Inhale 2 puffs into the lungs every 6 (six) hours as needed for Wheezing or Shortness of Breath.                    2. Yellow - Caution. 50-79% Personal Best Peak Flow  Use reliever medicine to keep an asthma attack from getting bad.   Cough  Quick Relievers  Wheezing  Tight Chest  Wake up at night Medicine How much to take When to take it    If symptoms are not improving in 24-48 hrs, call office for further instructions  Medications       Sympathomimetics Instructions     albuterol 108 (90 Base) MCG/ACT Inhalation Aero Soln Inhale 2 puffs into the lungs every 6 (six) hours as needed for Wheezing or Shortness of Breath.                    3. Red - Stop! Danger! <50% Personal Best Peak Flow  Continue Controller Medications But ADD:   Medicine not helping  Breathing is hard and fast  Nose opens wide  Can't walk  Ribs show  Can't talk well Medicine How much to take When to take it    If your symptoms do not improve in ONE hour -   go to the emergency room or call 911 immediately! If symptoms improve, call office for appointment immediately.    Albuterol inhaler 2 puffs every 20 minutes for three treatments       Don't forget:  Rinse mouth after using inhaler  Use spacer for inhaler  Remember to get your Flu vaccine every fall!    [x] Asthma Action Plan reviewed with the caregiver and patient, and a copy of the plan was given to the patient/caregiver.   [] Asthma Action Plan reviewed with the caregiver and patient on the phone, and copy mailed to patient/caregiver or sent via [employer].     Signatures:   Provider  Luz Maria Hemphill, DO Patient  Sarah Sextonens Caretaker

## (undated) NOTE — MR AVS SNAPSHOT
After Visit Summary   12/6/2021    Dilma Knight   MRN: [id number]           Visit Information     Date & Time  12/6/2021 12:00 PM Provider  [facility] Dept.  Phone  [phone] LIPID PANEL [[id number] CUSTOM]     Future Labs/Procedures Expected by Expires    VITAMIN D [[id number] CUSTOM]  12/6/2021 (Approximate) 12/6/2022             Did you know that [facility] primary care physicians now offer Video Visits through [address] for adult patie

## (undated) NOTE — LETTER
:  1980      To Whom It May Concern: This patient was seen in our office on 21. Please excuse from work on 21 and 21. If this office may be of further assistance, please do not hesitate to contact us.       Sincerely,

## (undated) NOTE — LETTER
ASTHMA ACTION PLAN for Alejandro Rueda     : 1980     Date: 2019  Provider:  Katty Corral DO  Phone for doctor or clinic: [address], [address]  [address] [place] [address]  [phone]    ACT

## (undated) NOTE — LETTER
Date: 1/2/2020    Patient Name: Hodan Crespo          To Whom it may concern: This letter has been written at the patient's request. The above patient was seen at the [facility] for treatment of a medical condition.     This patient shoul